# Patient Record
Sex: FEMALE | Race: BLACK OR AFRICAN AMERICAN | NOT HISPANIC OR LATINO | Employment: PART TIME | URBAN - METROPOLITAN AREA
[De-identification: names, ages, dates, MRNs, and addresses within clinical notes are randomized per-mention and may not be internally consistent; named-entity substitution may affect disease eponyms.]

---

## 2019-11-18 ENCOUNTER — OCCUPATIONAL HEALTH (OUTPATIENT)
Dept: URGENT CARE | Facility: CLINIC | Age: 29
End: 2019-11-18

## 2019-11-18 VITALS
DIASTOLIC BLOOD PRESSURE: 76 MMHG | BODY MASS INDEX: 34.16 KG/M2 | OXYGEN SATURATION: 100 % | HEART RATE: 70 BPM | WEIGHT: 205 LBS | SYSTOLIC BLOOD PRESSURE: 112 MMHG | RESPIRATION RATE: 18 BRPM | TEMPERATURE: 99 F | HEIGHT: 65 IN

## 2019-11-18 DIAGNOSIS — Z02.1 PHYSICAL EXAM, PRE-EMPLOYMENT: Primary | ICD-10-CM

## 2019-11-18 PROCEDURE — 99499 PHYSICAL - BASIC COMPLEXITY: ICD-10-PCS | Mod: S$GLB,,, | Performed by: NURSE PRACTITIONER

## 2019-11-18 PROCEDURE — 99499 UNLISTED E&M SERVICE: CPT | Mod: S$GLB,,, | Performed by: NURSE PRACTITIONER

## 2019-11-19 NOTE — PROGRESS NOTES
"Subjective:       Patient ID: Liliana Teague is a 29 y.o. female.    Vitals:  height is 5' 5" (1.651 m) and weight is 93 kg (205 lb). Her temperature is 99 °F (37.2 °C). Her blood pressure is 112/76 and her pulse is 70. Her respiration is 18 and oxygen saturation is 100%.     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  She is not on any medications.  Denies medical history.  Surgical history of breast reduction.    Other   This is a new problem. The current episode started today. Pertinent negatives include no abdominal pain, anorexia, arthralgias, change in bowel habit, chest pain, chills, congestion, coughing, diaphoresis, fatigue, fever, headaches, joint swelling, myalgias, nausea, neck pain, numbness, rash, sore throat, swollen glands, urinary symptoms, vertigo, visual change, vomiting or weakness.       Constitution: Negative for chills, sweating, fatigue and fever.   HENT: Negative for congestion and sore throat.    Neck: Negative for neck pain and painful lymph nodes.   Cardiovascular: Negative for chest pain and leg swelling.   Eyes: Negative for double vision and blurred vision.   Respiratory: Negative for cough and shortness of breath.    Gastrointestinal: Negative for abdominal pain, nausea, vomiting and diarrhea.   Genitourinary: Negative for dysuria, frequency, urgency and history of kidney stones.   Musculoskeletal: Negative for joint pain, joint swelling, muscle cramps and muscle ache.   Skin: Negative for color change, pale, rash and bruising.   Allergic/Immunologic: Negative for seasonal allergies.   Neurological: Negative for dizziness, history of vertigo, light-headedness, passing out, headaches and numbness.   Hematologic/Lymphatic: Negative for swollen lymph nodes.   Psychiatric/Behavioral: Negative for nervous/anxious, sleep " disturbance and depression. The patient is not nervous/anxious.        Objective:      Physical Exam   Constitutional: She is oriented to person, place, and time. She appears well-developed and well-nourished. She is cooperative.  Non-toxic appearance. She does not have a sickly appearance. She does not appear ill. No distress.   HENT:   Head: Normocephalic and atraumatic.   Right Ear: Hearing, tympanic membrane, external ear and ear canal normal.   Left Ear: Hearing, tympanic membrane, external ear and ear canal normal.   Nose: Nose normal. No mucosal edema, rhinorrhea or nasal deformity. No epistaxis. Right sinus exhibits no maxillary sinus tenderness and no frontal sinus tenderness. Left sinus exhibits no maxillary sinus tenderness and no frontal sinus tenderness.   Mouth/Throat: Uvula is midline, oropharynx is clear and moist and mucous membranes are normal. No trismus in the jaw. Normal dentition. No uvula swelling. No posterior oropharyngeal erythema.   Eyes: Pupils are equal, round, and reactive to light. Conjunctivae, EOM and lids are normal. Right eye exhibits no discharge. Left eye exhibits no discharge. No scleral icterus.   Neck: Trachea normal, normal range of motion, full passive range of motion without pain and phonation normal. Neck supple. No spinous process tenderness and no muscular tenderness present. Normal range of motion present.   Cardiovascular: Normal rate, regular rhythm, normal heart sounds, intact distal pulses and normal pulses.   Pulmonary/Chest: Effort normal and breath sounds normal. No respiratory distress. She has no wheezes.   Abdominal: Soft. Normal appearance and bowel sounds are normal. She exhibits no distension, no pulsatile midline mass and no mass. There is no tenderness. There is no rigidity, no rebound, no guarding and no CVA tenderness.   Musculoskeletal: Normal range of motion. She exhibits no edema or deformity.   Neurological: She is alert and oriented to person,  place, and time. She has normal strength and normal reflexes. She displays no atrophy and no tremor. No cranial nerve deficit or sensory deficit. She exhibits normal muscle tone. Coordination and gait normal.   Skin: Skin is warm, dry, intact, not diaphoretic and not pale. Capillary refill takes less than 2 seconds.   Psychiatric: She has a normal mood and affect. Her speech is normal and behavior is normal. Judgment and thought content normal. Cognition and memory are normal.   Nursing note and vitals reviewed.        Assessment:       1. Physical exam, pre-employment        Plan:       Cleared.  See scanned in sheet.  Physical exam, pre-employment

## 2019-11-19 NOTE — PATIENT INSTRUCTIONS
Cleared.    Nonurgent Medical Screening Exam  You have had a medical screening exam. The results show that you dont have a condition that needs to be treated in the emergency department.  You can safely wait until you can see your healthcare provider for evaluation or treatment. It is up to you to make an appointment for follow-up care.  Medical emergencies  If you think you have a medical emergency, please come to the emergency department. Thats what we are here for. A medical emergency might be severe pain. It might be a condition that gets worse. Or it might be problems with a pregnancy.  The emergency department is open to all who need treatment. But if you dont think you have a serious or life-threatening problem, try these other choices.  If you have a primary care doctor:  Call your doctor before coming to the emergency department.  After office hours, someone from your doctors office is on-call by phone. The person on-call may be able to give you advice over the phone on how to take care of the problem  You may be able to get an appointment to see your doctor.  If you dont have a primary care doctor:  Call the referral doctor or clinic shown below during office hours. You should be able to make an appointment to be seen.  If you arent sure whether you are having an emergency, you can always return to the emergency department to be looked at.   Phone advice from the emergency department  We are here 24 hours a day to give emergency care. But this hospital does not give phone advice for medical conditions. If you need advice for a condition that cant wait to be seen by your doctor, you will need to come back to this facility in person.  Date Last Reviewed: 9/1/2016  © 7834-3386 [employer]. [address] [place] [address], [place] [address]. All rights reserved. This information is not intended as a substitute for professional medical care. Always follow your healthcare professional's  instructions.

## 2024-03-12 ENCOUNTER — OFFICE VISIT (OUTPATIENT)
Dept: OBSTETRICS AND GYNECOLOGY | Facility: CLINIC | Age: 34
End: 2024-03-12
Payer: COMMERCIAL

## 2024-03-12 VITALS
SYSTOLIC BLOOD PRESSURE: 126 MMHG | HEART RATE: 83 BPM | HEIGHT: 65 IN | WEIGHT: 208.56 LBS | DIASTOLIC BLOOD PRESSURE: 63 MMHG | BODY MASS INDEX: 34.75 KG/M2

## 2024-03-12 DIAGNOSIS — Z01.419 WELL WOMAN EXAM WITH ROUTINE GYNECOLOGICAL EXAM: Primary | ICD-10-CM

## 2024-03-12 DIAGNOSIS — Z12.4 SCREENING FOR MALIGNANT NEOPLASM OF CERVIX: ICD-10-CM

## 2024-03-12 DIAGNOSIS — Z30.014 ENCOUNTER FOR INITIAL PRESCRIPTION OF INTRAUTERINE CONTRACEPTIVE DEVICE: ICD-10-CM

## 2024-03-12 PROCEDURE — 87624 HPV HI-RISK TYP POOLED RSLT: CPT | Performed by: STUDENT IN AN ORGANIZED HEALTH CARE EDUCATION/TRAINING PROGRAM

## 2024-03-12 PROCEDURE — 1159F MED LIST DOCD IN RCRD: CPT | Mod: CPTII,S$GLB,, | Performed by: STUDENT IN AN ORGANIZED HEALTH CARE EDUCATION/TRAINING PROGRAM

## 2024-03-12 PROCEDURE — 3078F DIAST BP <80 MM HG: CPT | Mod: CPTII,S$GLB,, | Performed by: STUDENT IN AN ORGANIZED HEALTH CARE EDUCATION/TRAINING PROGRAM

## 2024-03-12 PROCEDURE — 3074F SYST BP LT 130 MM HG: CPT | Mod: CPTII,S$GLB,, | Performed by: STUDENT IN AN ORGANIZED HEALTH CARE EDUCATION/TRAINING PROGRAM

## 2024-03-12 PROCEDURE — 99459 PELVIC EXAMINATION: CPT | Mod: S$GLB,,, | Performed by: STUDENT IN AN ORGANIZED HEALTH CARE EDUCATION/TRAINING PROGRAM

## 2024-03-12 PROCEDURE — 3008F BODY MASS INDEX DOCD: CPT | Mod: CPTII,S$GLB,, | Performed by: STUDENT IN AN ORGANIZED HEALTH CARE EDUCATION/TRAINING PROGRAM

## 2024-03-12 PROCEDURE — 88175 CYTOPATH C/V AUTO FLUID REDO: CPT | Performed by: STUDENT IN AN ORGANIZED HEALTH CARE EDUCATION/TRAINING PROGRAM

## 2024-03-12 PROCEDURE — 99999 PR PBB SHADOW E&M-NEW PATIENT-LVL III: CPT | Mod: PBBFAC,,, | Performed by: STUDENT IN AN ORGANIZED HEALTH CARE EDUCATION/TRAINING PROGRAM

## 2024-03-12 PROCEDURE — 1160F RVW MEDS BY RX/DR IN RCRD: CPT | Mod: CPTII,S$GLB,, | Performed by: STUDENT IN AN ORGANIZED HEALTH CARE EDUCATION/TRAINING PROGRAM

## 2024-03-12 PROCEDURE — 99385 PREV VISIT NEW AGE 18-39: CPT | Mod: S$GLB,,, | Performed by: STUDENT IN AN ORGANIZED HEALTH CARE EDUCATION/TRAINING PROGRAM

## 2024-03-12 NOTE — PROGRESS NOTES
"[facility]    Subjective:     Chief Complaint:   Chief Complaint   Patient presents with    Contraception     Discuss IUD replacement       Patient's last menstrual period was Patient's last menstrual period was 2024 (exact date).  Contraception: IUD.      2024    Liliana Montes is a 34 y.o. female  who presents for an annual exam.  She does not want STD screening.  She participates in regular exercise: no.  She does not smoke or vape.  She is taking a multivitamin.     She wants to get another Paragard IUD. She has had her current Paragard since . She has not had an issues with the IUD.     Menstrual cycles occur monthly lasting 6 days with no cramping or heavy flow issues. No issues with her cycles.    Last Pap: 2 years ago. She reports having an abnormal pap smear over 10 years ago and she had "get my cervix scraped".   Gardasil series: she reports getting one dose, unsure if she finished series. She will check her records.     FH:  Breast cancer: maternal cousin (alive).  Colon cancer: none.  Endometrial cancer: none.  Ovarian cancer: none.  Cervical cancer: none.       OB History    Para Term  AB Living   2 2 2     2   SAB IAB Ectopic Multiple Live Births           2      # Outcome Date GA Lbr [place]/2nd Weight Sex Delivery Anes PTL Lv   2 Term 14    F Vag-Spont   JACOB   1 Term 09    M Vag-Spont   JACOB       Past Medical History:   Diagnosis Date    Abnormal Pap smear of cervix      Past Surgical History:   Procedure Laterality Date    TOTAL REDUCTION MAMMOPLASTY  2007    Reduction      Review of patient's allergies indicates:  No Known Allergies    Social History     Tobacco Use    Smoking status: Never    Smokeless tobacco: Never   Substance Use Topics    Alcohol use: Not Currently     Comment: rarely    Drug use: Never       Family History   Problem Relation Age of Onset    No Known Problems Father     No Known Problems Mother     " "Breast cancer Maternal Cousin        Medications  No current outpatient medications on file prior to visit.       Review of Systems   Constitutional: Negative for appetite change, fever and unexpected weight change.   Respiratory: Negative for cough and shortness of breath.    Cardiovascular: Negative for chest pain and palpitations.   Genitourinary: Negative for dyspareunia, dysuria, hematuria and pelvic pain.        GYN ROS per HPI.   Psychiatric/Behavioral: The patient is not nervous/anxious.      Objective:     /63 (BP Location: Left arm, Patient Position: Sitting, BP Method: Medium (Automatic))   Pulse 83   Ht 5' 5" (1.651 m)   Wt 94.6 kg (208 lb 8.9 oz)   LMP 02/26/2024 (Exact Date)   BMI 34.71 kg/m²     Physical Exam  Vitals reviewed. Chaperone present: Female chaperone present.   Constitutional:       General: She is not in acute distress.  HENT:      Head: Normocephalic.   Pulmonary:      Effort: Pulmonary effort is normal. No respiratory distress.   Chest:   Breasts:     Right: No bleeding, mass, nipple discharge, skin change or tenderness.      Left: No bleeding, mass, nipple discharge, skin change or tenderness.      Comments: Breast reduction scars on inferior side of breasts bilaterally.  Well-healed.   Abdominal:      General: Abdomen is flat.      Palpations: Abdomen is soft.   Genitourinary:     Pubic Area: No rash.       Labia:         Right: No tenderness or lesion.         Left: No tenderness or lesion.       Vagina: No tenderness or prolapsed vaginal walls.      Cervix: No cervical motion tenderness.      Uterus: Not enlarged and not tender.       Adnexa:         Right: No mass or tenderness.          Left: No mass or tenderness.        Comments: IUD strings visible in cervical os.    Lymphadenopathy:      Upper Body:      Right upper body: No axillary or pectoral adenopathy.      Left upper body: No axillary or pectoral adenopathy.   Skin:     Findings: No rash.   Neurological:      " General: No focal deficit present.      Mental Status: She is alert.   Psychiatric:         Mood and Affect: Mood normal.         Behavior: Behavior normal.         Assessment:     1. Well woman exam with routine gynecological exam    2. Screening for malignant neoplasm of cervix    3. Encounter for initial prescription of intrauterine contraceptive device      Plan:     1. Well woman exam with routine gynecological exam    2. Screening for malignant neoplasm of cervix  - Liquid-Based Pap Smear, Screening  - HPV High Risk Genotypes, PCR    3. Encounter for initial prescription of intrauterine contraceptive device  - Device Authorization Order      Follow up for IUD placement.       The above was reviewed and discussed with the patient.    Annual exam and screening issues based on the patient's age and family history were discussed.     Breast and pelvic exams were normal.     Order placed for new ParaGard IUD.  Discussed that this is a nonhormonal IUD.  Explained the contraceptive benefits.  Discussed that it does not protect against STDs.  IUD is good for 10 years.    - Pap and HPV testing performed. Further recommendations for future pap smears and HPV testing will be provided once results return on the portal.   - Counseled to get regular exercise at least 3 days a week doing 30 minutes of high intensity exercise or 60 minutes of low-moderate intensity exercise if patient is not currently exercising.   - Counseled to  daily multivitamin for bone integrity.      The patient's questions were answered, and she agrees with the current plan.      The patient was counseled today on the new ACS guidelines for cervical cytology screening as well as the current recommendations for breast cancer screening. She was counseled to follow up with her PCP for other routine health maintenance.      Ifeoma Sterling PA-C  03/12/2024

## 2024-03-19 LAB
HPV HR 12 DNA SPEC QL NAA+PROBE: NEGATIVE
HPV16 AG SPEC QL: NEGATIVE
HPV18 DNA SPEC QL NAA+PROBE: NEGATIVE

## 2024-03-20 LAB
FINAL PATHOLOGIC DIAGNOSIS: NORMAL
Lab: NORMAL

## 2024-03-25 NOTE — PROGRESS NOTES
"[facility]   OFFICE VISIT NOTE    Patient Name: Liliana Montes  YOB: 1990    PRESENTING HISTORY     History of Present Illness:  Ms. Liliana Montes is a 34 y.o. female here for annual  Patient already had well women's annual on 3/12/2024     LMP: 2024     Paraguard IUD 2014 -- would like to continue with the same IUD   Going back next month for IUD insertion     Family history: maternal cousin (alive) -- breast cancer -- believes this was in her 40's     Pap and HPV both normal on 3/12/2024     Surgery: total reduction mammoplasty     Works as a RN, night shifts  Has two kids aged 10 and 14  Sexually active with her       Review of Systems   Constitutional:  Negative for chills, diaphoresis, fatigue and fever.   Respiratory:  Negative for cough, shortness of breath and wheezing.    Cardiovascular:  Negative for chest pain and palpitations.   Gastrointestinal:  Negative for abdominal pain, blood in stool, constipation, diarrhea, nausea and vomiting.   Genitourinary:  Negative for bladder incontinence and hematuria.   Musculoskeletal:         Left shoulder pain   Neurological:  Negative for coordination difficulties.   Psychiatric/Behavioral:  Negative for behavioral problems.           OBJECTIVE:   Vital Signs:  Vitals:    24 0800   BP: 110/70   Pulse: 70   Resp: 18   SpO2: 99%   Weight: 93.6 kg (206 lb 5.6 oz)   Height: 5' 5" (1.651 m)           Physical Exam  Constitutional:       General: She is not in acute distress.     Appearance: She is not ill-appearing or toxic-appearing.   HENT:      Head: Normocephalic and atraumatic.      Mouth/Throat:      Mouth: Mucous membranes are moist.      Pharynx: Uvula midline. No pharyngeal swelling or oropharyngeal exudate.   Eyes:      Extraocular Movements: Extraocular movements intact.      Pupils: Pupils are equal, round, and reactive to light.   Cardiovascular:      Rate and Rhythm: Normal rate and " regular rhythm.   Pulmonary:      Effort: Pulmonary effort is normal. No tachypnea, bradypnea, accessory muscle usage, prolonged expiration or respiratory distress.      Breath sounds: Normal breath sounds. No stridor. No wheezing, rhonchi or rales.   Abdominal:      General: Abdomen is flat. Bowel sounds are normal. There is no distension.      Palpations: Abdomen is soft.      Tenderness: There is no abdominal tenderness. There is no guarding or rebound.   Neurological:      General: No focal deficit present.      Mental Status: She is alert.   Psychiatric:         Mood and Affect: Mood normal.         Behavior: Behavior normal.         ASSESSMENT & PLAN:     Routine general medical examination at a health care facility  -     CBC Without Differential; Future; Expected date: 03/26/2024  -     Comprehensive Metabolic Panel; Future; Expected date: 03/26/2024  -     Lipid Panel; Future; Expected date: 03/26/2024  -     Hemoglobin A1C; Future; Expected date: 03/26/2024  -     TSH; Future; Expected date: 03/26/2024  -     T4, FREE; Future; Expected date: 03/26/2024  -     Hepatitis Panel, Acute; Future; Expected date: 03/26/2024  -     HIV 1/2 Ag/Ab (4th Gen); Future; Expected date: 03/26/2024  -     RPR; Future; Expected date: 03/26/2024  -     C. trachomatis/N. gonorrhoeae by AMP DNA [facility]; Urine; Future; Expected date: 03/26/2024  -     (In Office Administered) Tdap Vaccine  Patient agrees to get STD panel done   Tdap vaccine administered   Will be returning to ob/gyn for another paraguard IUD placement next week       BMI 34.0-34.9,adult  Will check thyroid etiology     Chronic left shoulder pain   For left shoulder pain that is associated with carrying a heavy bag on her left shoulder -- suggested carrying a backpack to distribute the weight more equally between the two shoulders     History of abnormal pap smear  Recent pap smear -- normal for both cytology and HPV    Continue to follow up with ob/gyn     Sun  Maicol Allen MD  Family Medicine  [facility]     This note was created using [employer] voice recognition software that occasionally misinterprets phrases or words

## 2024-03-26 ENCOUNTER — LAB VISIT (OUTPATIENT)
Dept: LAB | Facility: HOSPITAL | Age: 34
End: 2024-03-26
Attending: STUDENT IN AN ORGANIZED HEALTH CARE EDUCATION/TRAINING PROGRAM
Payer: COMMERCIAL

## 2024-03-26 ENCOUNTER — OFFICE VISIT (OUTPATIENT)
Dept: FAMILY MEDICINE | Facility: CLINIC | Age: 34
End: 2024-03-26
Payer: COMMERCIAL

## 2024-03-26 VITALS
WEIGHT: 206.38 LBS | HEIGHT: 65 IN | RESPIRATION RATE: 18 BRPM | HEART RATE: 70 BPM | SYSTOLIC BLOOD PRESSURE: 110 MMHG | DIASTOLIC BLOOD PRESSURE: 70 MMHG | OXYGEN SATURATION: 99 % | BODY MASS INDEX: 34.38 KG/M2

## 2024-03-26 DIAGNOSIS — G89.29 CHRONIC LEFT SHOULDER PAIN: ICD-10-CM

## 2024-03-26 DIAGNOSIS — Z00.00 ROUTINE GENERAL MEDICAL EXAMINATION AT A HEALTH CARE FACILITY: ICD-10-CM

## 2024-03-26 DIAGNOSIS — Z00.00 ROUTINE GENERAL MEDICAL EXAMINATION AT A HEALTH CARE FACILITY: Primary | ICD-10-CM

## 2024-03-26 DIAGNOSIS — Z87.42 HISTORY OF ABNORMAL CERVICAL PAP SMEAR: ICD-10-CM

## 2024-03-26 DIAGNOSIS — M25.512 CHRONIC LEFT SHOULDER PAIN: ICD-10-CM

## 2024-03-26 PROCEDURE — 1160F RVW MEDS BY RX/DR IN RCRD: CPT | Mod: CPTII,S$GLB,, | Performed by: STUDENT IN AN ORGANIZED HEALTH CARE EDUCATION/TRAINING PROGRAM

## 2024-03-26 PROCEDURE — 3074F SYST BP LT 130 MM HG: CPT | Mod: CPTII,S$GLB,, | Performed by: STUDENT IN AN ORGANIZED HEALTH CARE EDUCATION/TRAINING PROGRAM

## 2024-03-26 PROCEDURE — 87491 CHLMYD TRACH DNA AMP PROBE: CPT | Performed by: STUDENT IN AN ORGANIZED HEALTH CARE EDUCATION/TRAINING PROGRAM

## 2024-03-26 PROCEDURE — 90715 TDAP VACCINE 7 YRS/> IM: CPT | Mod: S$GLB,,, | Performed by: STUDENT IN AN ORGANIZED HEALTH CARE EDUCATION/TRAINING PROGRAM

## 2024-03-26 PROCEDURE — 99999 PR PBB SHADOW E&M-EST. PATIENT-LVL III: CPT | Mod: PBBFAC,,, | Performed by: STUDENT IN AN ORGANIZED HEALTH CARE EDUCATION/TRAINING PROGRAM

## 2024-03-26 PROCEDURE — 3008F BODY MASS INDEX DOCD: CPT | Mod: CPTII,S$GLB,, | Performed by: STUDENT IN AN ORGANIZED HEALTH CARE EDUCATION/TRAINING PROGRAM

## 2024-03-26 PROCEDURE — 99214 OFFICE O/P EST MOD 30 MIN: CPT | Mod: 25,S$GLB,, | Performed by: STUDENT IN AN ORGANIZED HEALTH CARE EDUCATION/TRAINING PROGRAM

## 2024-03-26 PROCEDURE — 90471 IMMUNIZATION ADMIN: CPT | Mod: S$GLB,,, | Performed by: STUDENT IN AN ORGANIZED HEALTH CARE EDUCATION/TRAINING PROGRAM

## 2024-03-26 PROCEDURE — 1159F MED LIST DOCD IN RCRD: CPT | Mod: CPTII,S$GLB,, | Performed by: STUDENT IN AN ORGANIZED HEALTH CARE EDUCATION/TRAINING PROGRAM

## 2024-03-26 PROCEDURE — 3078F DIAST BP <80 MM HG: CPT | Mod: CPTII,S$GLB,, | Performed by: STUDENT IN AN ORGANIZED HEALTH CARE EDUCATION/TRAINING PROGRAM

## 2024-03-27 DIAGNOSIS — D64.9 LOW HEMOGLOBIN: Primary | ICD-10-CM

## 2024-03-27 LAB
C TRACH DNA SPEC QL NAA+PROBE: NOT DETECTED
N GONORRHOEA DNA SPEC QL NAA+PROBE: NOT DETECTED

## 2024-04-02 ENCOUNTER — LAB VISIT (OUTPATIENT)
Dept: LAB | Facility: HOSPITAL | Age: 34
End: 2024-04-02
Attending: STUDENT IN AN ORGANIZED HEALTH CARE EDUCATION/TRAINING PROGRAM
Payer: COMMERCIAL

## 2024-04-02 DIAGNOSIS — D64.9 LOW HEMOGLOBIN: ICD-10-CM

## 2024-04-02 LAB
BASOPHILS # BLD AUTO: 0.02 K/UL (ref 0–0.2)
BASOPHILS NFR BLD: 0.3 % (ref 0–1.9)
DIFFERENTIAL METHOD BLD: ABNORMAL
EOSINOPHIL # BLD AUTO: 0.1 K/UL (ref 0–0.5)
EOSINOPHIL NFR BLD: 1.3 % (ref 0–8)
ERYTHROCYTE [DISTWIDTH] IN BLOOD BY AUTOMATED COUNT: 20.9 % (ref 11.5–14.5)
FERRITIN SERPL-MCNC: 3 NG/ML (ref 20–300)
FOLATE SERPL-MCNC: 12.5 NG/ML (ref 4–24)
HCT VFR BLD AUTO: 27.3 % (ref 37–48.5)
HGB BLD-MCNC: 8 G/DL (ref 12–16)
IMM GRANULOCYTES # BLD AUTO: 0.02 K/UL (ref 0–0.04)
IMM GRANULOCYTES NFR BLD AUTO: 0.3 % (ref 0–0.5)
IRON SERPL-MCNC: 13 UG/DL (ref 30–160)
LYMPHOCYTES # BLD AUTO: 2.8 K/UL (ref 1–4.8)
LYMPHOCYTES NFR BLD: 45.1 % (ref 18–48)
MCH RBC QN AUTO: 20 PG (ref 27–31)
MCHC RBC AUTO-ENTMCNC: 29.3 G/DL (ref 32–36)
MCV RBC AUTO: 68 FL (ref 82–98)
MONOCYTES # BLD AUTO: 0.4 K/UL (ref 0.3–1)
MONOCYTES NFR BLD: 5.9 % (ref 4–15)
NEUTROPHILS # BLD AUTO: 2.9 K/UL (ref 1.8–7.7)
NEUTROPHILS NFR BLD: 47.1 % (ref 38–73)
NRBC BLD-RTO: 0 /100 WBC
PLATELET # BLD AUTO: 491 K/UL (ref 150–450)
PMV BLD AUTO: 9.8 FL (ref 9.2–12.9)
RBC # BLD AUTO: 4.01 M/UL (ref 4–5.4)
SATURATED IRON: 3 % (ref 20–50)
TOTAL IRON BINDING CAPACITY: 477 UG/DL (ref 250–450)
TRANSFERRIN SERPL-MCNC: 322 MG/DL (ref 200–375)
VIT B12 SERPL-MCNC: 372 PG/ML (ref 210–950)
WBC # BLD AUTO: 6.12 K/UL (ref 3.9–12.7)

## 2024-04-02 PROCEDURE — 82728 ASSAY OF FERRITIN: CPT | Performed by: STUDENT IN AN ORGANIZED HEALTH CARE EDUCATION/TRAINING PROGRAM

## 2024-04-02 PROCEDURE — 82746 ASSAY OF FOLIC ACID SERUM: CPT | Performed by: STUDENT IN AN ORGANIZED HEALTH CARE EDUCATION/TRAINING PROGRAM

## 2024-04-02 PROCEDURE — 83020 HEMOGLOBIN ELECTROPHORESIS: CPT | Performed by: STUDENT IN AN ORGANIZED HEALTH CARE EDUCATION/TRAINING PROGRAM

## 2024-04-02 PROCEDURE — 36415 COLL VENOUS BLD VENIPUNCTURE: CPT | Mod: PO | Performed by: STUDENT IN AN ORGANIZED HEALTH CARE EDUCATION/TRAINING PROGRAM

## 2024-04-02 PROCEDURE — 82607 VITAMIN B-12: CPT | Performed by: STUDENT IN AN ORGANIZED HEALTH CARE EDUCATION/TRAINING PROGRAM

## 2024-04-02 PROCEDURE — 83540 ASSAY OF IRON: CPT | Performed by: STUDENT IN AN ORGANIZED HEALTH CARE EDUCATION/TRAINING PROGRAM

## 2024-04-02 PROCEDURE — 85025 COMPLETE CBC W/AUTO DIFF WBC: CPT | Performed by: STUDENT IN AN ORGANIZED HEALTH CARE EDUCATION/TRAINING PROGRAM

## 2024-04-03 DIAGNOSIS — D50.8 OTHER IRON DEFICIENCY ANEMIA: Primary | ICD-10-CM

## 2024-04-04 LAB
HB ELECTROPHORESIS INTERP CANCEL: NORMAL
HB ELECTROPHORESIS INTERPRETATION: NORMAL
HGB A MFR BLD ELPH: 97.1 % (ref 95.8–98)
HGB A2 MFR BLD: 2.1 % (ref 2–3.3)
HGB A2+XXX MFR BLD ELPH: NORMAL %
HGB F MFR BLD: 0.8 % (ref 0–0.9)
HGB XXX MFR BLD ELPH: NORMAL %
HPLC HB VARIANT: NORMAL

## 2024-04-09 ENCOUNTER — PATIENT MESSAGE (OUTPATIENT)
Dept: FAMILY MEDICINE | Facility: CLINIC | Age: 34
End: 2024-04-09
Payer: COMMERCIAL

## 2024-04-18 ENCOUNTER — OFFICE VISIT (OUTPATIENT)
Dept: HEMATOLOGY/ONCOLOGY | Facility: CLINIC | Age: 34
End: 2024-04-18
Payer: COMMERCIAL

## 2024-04-18 VITALS
TEMPERATURE: 98 F | OXYGEN SATURATION: 99 % | SYSTOLIC BLOOD PRESSURE: 123 MMHG | HEART RATE: 72 BPM | BODY MASS INDEX: 34.85 KG/M2 | WEIGHT: 209.44 LBS | DIASTOLIC BLOOD PRESSURE: 77 MMHG

## 2024-04-18 DIAGNOSIS — D50.8 OTHER IRON DEFICIENCY ANEMIA: ICD-10-CM

## 2024-04-18 DIAGNOSIS — E66.9 OBESITY (BMI 30.0-34.9): ICD-10-CM

## 2024-04-18 DIAGNOSIS — T83.89XA MENORRHAGIA DUE TO INTRAUTERINE DEVICE (IUD): Primary | ICD-10-CM

## 2024-04-18 DIAGNOSIS — N92.0 MENORRHAGIA DUE TO INTRAUTERINE DEVICE (IUD): Primary | ICD-10-CM

## 2024-04-18 PROBLEM — E66.811 OBESITY (BMI 30.0-34.9): Status: ACTIVE | Noted: 2024-04-18

## 2024-04-18 PROBLEM — D50.9 IRON DEFICIENCY ANEMIA: Status: ACTIVE | Noted: 2024-04-18

## 2024-04-18 PROCEDURE — 3078F DIAST BP <80 MM HG: CPT | Mod: CPTII,S$GLB,, | Performed by: INTERNAL MEDICINE

## 2024-04-18 PROCEDURE — 1160F RVW MEDS BY RX/DR IN RCRD: CPT | Mod: CPTII,S$GLB,, | Performed by: INTERNAL MEDICINE

## 2024-04-18 PROCEDURE — 99999 PR PBB SHADOW E&M-EST. PATIENT-LVL III: CPT | Mod: PBBFAC,,, | Performed by: INTERNAL MEDICINE

## 2024-04-18 PROCEDURE — 3074F SYST BP LT 130 MM HG: CPT | Mod: CPTII,S$GLB,, | Performed by: INTERNAL MEDICINE

## 2024-04-18 PROCEDURE — 1159F MED LIST DOCD IN RCRD: CPT | Mod: CPTII,S$GLB,, | Performed by: INTERNAL MEDICINE

## 2024-04-18 PROCEDURE — 99204 OFFICE O/P NEW MOD 45 MIN: CPT | Mod: S$GLB,,, | Performed by: INTERNAL MEDICINE

## 2024-04-18 PROCEDURE — 3044F HG A1C LEVEL LT 7.0%: CPT | Mod: CPTII,S$GLB,, | Performed by: INTERNAL MEDICINE

## 2024-04-18 PROCEDURE — 3008F BODY MASS INDEX DOCD: CPT | Mod: CPTII,S$GLB,, | Performed by: INTERNAL MEDICINE

## 2024-04-18 NOTE — PROGRESS NOTES
Subjective:       Name: Liliana Montes  : 1990  MRN: [id number]    Chief Complaint   Patient presents with    Anemia     New Patient - Other iron deficiency anemia        Patient is in clinic by herself    HPI: Liliana Montes is a 34 y.o. female presents for evaluation of Anemia (New Patient - Other iron deficiency anemia)    Blood workup done on 2024 showing a hemoglobin of 8 MCV of 68 and platelet count of 491.  Looking at her blood workup going to  the patient has been chronically having a microcytic anemia.  Iron saturation was 3% TIBC was 477 her ferritin was 3.  Vitamin B12 folate were normal.  Hemoglobin electrophoresis was negative.    She had a blood transfusion in  after having her second child. She has an IUD that causes her heavy menstrual bleeding.  The 1st 3 days are the heaviest.  She has been feeling fatigued and crunching on ice.  She denies any chest pain palpitation shortness of breath arthralgia.  Also she has been feeling very sleepy.      The patient is not a vegetarian.  She denies any family history of blood related malignancy or sickle cell trait or disease.  She denies any melena hematochezia hematuria or hematemesis.  Maternal cousin had breast cancer at the age of 41 yo.    Oncology History    No history exists.        Past Medical History:   Diagnosis Date    Abnormal Pap smear of cervix        Past Surgical History:   Procedure Laterality Date    TOTAL REDUCTION MAMMOPLASTY  2007    Reduction        Family History   Problem Relation Name Age of Onset    No Known Problems Mother      Gout Father      Hypertension Paternal Grandmother      Breast cancer Maternal Cousin         Social History     Socioeconomic History    Marital status:    Tobacco Use    Smoking status: Never     Passive exposure: Current    Smokeless tobacco: Never   Substance and Sexual Activity    Alcohol use: Yes     Comment: rarely    Drug use: Never    Sexual activity: Yes      Partners: Male     Birth control/protection: I.U.D.     Social Determinants of Health     Financial Resource Strain: Low Risk  (3/20/2024)    Overall Financial Resource Strain (CARDIA)     Difficulty of Paying Living Expenses: Not hard at all   Food Insecurity: No Food Insecurity (3/20/2024)    Hunger Vital Sign     Worried About Running Out of Food in the Last Year: Never true     Ran Out of Food in the Last Year: Never true   Transportation Needs: No Transportation Needs (3/20/2024)    PRAPARE - Transportation     Lack of Transportation (Medical): No     Lack of Transportation (Non-Medical): No   Physical Activity: Insufficiently Active (3/20/2024)    Exercise Vital Sign     Days of Exercise per Week: 3 days     Minutes of Exercise per Session: 10 min   Stress: No Stress Concern Present (3/20/2024)    [employer] - Occupational Stress Questionnaire     Feeling of Stress : Only a little   Social Connections: Unknown (3/20/2024)    Social Connection and Isolation Panel [NHANES]     Frequency of Communication with Friends and Family: More than three times a week     Frequency of Social Gatherings with Friends and Family: Once a week     Active Member of Clubs or Organizations: No     Attends Club or Organization Meetings: Never     Marital Status:    Housing Stability: Low Risk  (3/20/2024)    Housing Stability Vital Sign     Unable to Pay for Housing in the Last Year: No     Number of Places Lived in the Last Year: 1     Unstable Housing in the Last Year: No       Review of patient's allergies indicates:  No Known Allergies    Review of Systems   Constitutional:  Positive for fatigue. Negative for appetite change and unexpected weight change.   HENT:  Negative for mouth sores.    Eyes:  Negative for visual disturbance.   Respiratory:  Negative for cough and shortness of breath.    Cardiovascular:  Negative for chest pain.   Gastrointestinal:  Negative for abdominal pain and  diarrhea.   Genitourinary:  Negative for frequency.   Musculoskeletal:  Negative for back pain.   Integumentary:  Negative for rash.   Neurological:  Negative for headaches.   Hematological:  Negative for adenopathy.   Psychiatric/Behavioral:  Positive for decreased concentration. The patient is not nervous/anxious.      Answers submitted by the patient for this visit:  Review of Systems Questionnaire (Submitted on 4/15/2024)  appetite change : No  unexpected weight change: No  mouth sores: No  visual disturbance: No  cough: No  shortness of breath: No  chest pain: No  abdominal pain: No  diarrhea: No  frequency: No  back pain: No  rash: No  headaches: No  adenopathy: No  nervous/ anxious: No         Objective:     Vitals:    04/18/24 1125   BP: 123/77   BP Location: Right arm   Patient Position: Sitting   BP Method: Medium (Automatic)   Pulse: 72   Temp: 97.8 °F (36.6 °C)   TempSrc: Temporal   SpO2: 99%   Weight: 95 kg (209 lb 7 oz)        Physical Exam  Vitals reviewed.   Constitutional:       Appearance: Normal appearance.   HENT:      Head: Normocephalic and atraumatic.   Eyes:      General: No scleral icterus.     Pupils: Pupils are equal, round, and reactive to light.   Cardiovascular:      Rate and Rhythm: Normal rate and regular rhythm.      Pulses: Normal pulses.      Heart sounds: Normal heart sounds.   Pulmonary:      Effort: Pulmonary effort is normal.      Breath sounds: Normal breath sounds.   Abdominal:      General: Bowel sounds are normal. There is no distension.   Musculoskeletal:         General: No swelling.   Lymphadenopathy:      Cervical: No cervical adenopathy.   Skin:     General: Skin is warm.      Findings: No rash.   Neurological:      General: No focal deficit present.      Mental Status: She is alert and oriented to person, place, and time.      Cranial Nerves: No cranial nerve deficit.      Motor: No weakness.   Psychiatric:         Mood and Affect: Mood normal.         Behavior:  Behavior normal.                No current outpatient medications on file.     No current facility-administered medications for this visit.       CBC:  Lab Results   Component Value Date    WBC 6.12 04/02/2024    HGB 8.0 (L) 04/02/2024    HCT 27.3 (L) 04/02/2024    MCV 68 (L) 04/02/2024     (H) 04/02/2024         CMP:  Sodium   Date Value Ref Range Status   03/26/2024 140 136 - 145 mmol/L Final     Potassium   Date Value Ref Range Status   03/26/2024 3.7 3.5 - 5.1 mmol/L Final     Chloride   Date Value Ref Range Status   03/26/2024 110 95 - 110 mmol/L Final     CO2   Date Value Ref Range Status   03/26/2024 19 (L) 23 - 29 mmol/L Final     Glucose   Date Value Ref Range Status   03/26/2024 97 70 - 110 mg/dL Final     BUN   Date Value Ref Range Status   03/26/2024 9 6 - 20 mg/dL Final     Creatinine   Date Value Ref Range Status   03/26/2024 0.8 0.5 - 1.4 mg/dL Final     Calcium   Date Value Ref Range Status   03/26/2024 9.4 8.7 - 10.5 mg/dL Final     Total Protein   Date Value Ref Range Status   03/26/2024 8.0 6.0 - 8.4 g/dL Final     Albumin   Date Value Ref Range Status   03/26/2024 3.9 3.5 - 5.2 g/dL Final     Total Bilirubin   Date Value Ref Range Status   03/26/2024 0.7 0.1 - 1.0 mg/dL Final     Comment:     For infants and newborns, interpretation of results should be based  on gestational age, weight and in agreement with clinical  observations.    Premature Infant recommended reference ranges:  Up to 24 hours.............<8.0 mg/dL  Up to 48 hours............<12.0 mg/dL  3-5 days..................<15.0 mg/dL  6-29 days.................<15.0 mg/dL       Alkaline Phosphatase   Date Value Ref Range Status   03/26/2024 46 (L) 55 - 135 U/L Final     AST   Date Value Ref Range Status   03/26/2024 15 10 - 40 U/L Final     ALT   Date Value Ref Range Status   03/26/2024 10 10 - 44 U/L Final     Anion Gap   Date Value Ref Range Status   03/26/2024 11 8 - 16 mmol/L Final       No image results found.       ECOG  SCORE    0 - Fully active-able to carry on all pre-disease performance without restriction              Assessment/Plan:       Iron-deficiency anemia secondary to to chronic blood loss.    I reviewed independently the patient medical record including her laboratory and radiologic findings.  The I discussed the with the patient the results of her blood workup that are confirming the presence of iron-deficiency anemia.  Most likely this is related to her heavy menstrual cycle.  Since she did not try to take oral iron will start with oral iron 1 tablet a day.  The side effects of taking oral iron was discussed at length.  This include but not restricted to nausea gastritis constipation and black stools.  She was advised to take a stool softener to avoid the constipation caused by the oral iron.  In case she is able to tolerated she was advised to increase it to 2 pills a day.  In case she has not able to tolerated she was advised to call me back to schedule her to start IV iron.  She will be seen back again in 6 weeks for a follow-up visit with repeat CBC and ferritin.      Menorrhagia with regular cycles.    Due to her IUD.    The patient is due to have it exchanged this year.      Obesity BMI 34.85 today.    The patient was advised to exercise with a heavy lifestyle and to lose weight               Med Onc Chart Routing      Follow up with physician 6 weeks. virtual visit with repeat CBC and ferritin   Follow up with [facility]    Infusion scheduling note    Injection scheduling note    Labs    Imaging    Pharmacy appointment    Other referrals                   Plan was discussed with the patient at length, and she verbalized understanding. Liliana was given an opportunity to ask questions that were answered to her satisfaction, and she was advised to call in the interval if any problems or questions arise.  Signed:  Mary Montes MD   Hematology and Oncology  [facility] - HEMATOLOGY  ONCOLOGY  [facility]

## 2024-05-30 ENCOUNTER — LAB VISIT (OUTPATIENT)
Dept: LAB | Facility: HOSPITAL | Age: 34
End: 2024-05-30
Attending: INTERNAL MEDICINE
Payer: COMMERCIAL

## 2024-05-30 DIAGNOSIS — D50.8 OTHER IRON DEFICIENCY ANEMIA: ICD-10-CM

## 2024-05-30 LAB
BASOPHILS # BLD AUTO: 0.01 K/UL (ref 0–0.2)
BASOPHILS NFR BLD: 0.2 % (ref 0–1.9)
DIFFERENTIAL METHOD BLD: ABNORMAL
EOSINOPHIL # BLD AUTO: 0 K/UL (ref 0–0.5)
EOSINOPHIL NFR BLD: 0.5 % (ref 0–8)
ERYTHROCYTE [DISTWIDTH] IN BLOOD BY AUTOMATED COUNT: 24.5 % (ref 11.5–14.5)
FERRITIN SERPL-MCNC: 15 NG/ML (ref 20–300)
HCT VFR BLD AUTO: 33.9 % (ref 37–48.5)
HGB BLD-MCNC: 10.5 G/DL (ref 12–16)
IMM GRANULOCYTES # BLD AUTO: 0.01 K/UL (ref 0–0.04)
IMM GRANULOCYTES NFR BLD AUTO: 0.2 % (ref 0–0.5)
LYMPHOCYTES # BLD AUTO: 2.2 K/UL (ref 1–4.8)
LYMPHOCYTES NFR BLD: 39.7 % (ref 18–48)
MCH RBC QN AUTO: 23.2 PG (ref 27–31)
MCHC RBC AUTO-ENTMCNC: 31 G/DL (ref 32–36)
MCV RBC AUTO: 75 FL (ref 82–98)
MONOCYTES # BLD AUTO: 0.3 K/UL (ref 0.3–1)
MONOCYTES NFR BLD: 6.2 % (ref 4–15)
NEUTROPHILS # BLD AUTO: 2.9 K/UL (ref 1.8–7.7)
NEUTROPHILS NFR BLD: 53.2 % (ref 38–73)
NRBC BLD-RTO: 0 /100 WBC
PLATELET # BLD AUTO: 407 K/UL (ref 150–450)
PMV BLD AUTO: 10 FL (ref 9.2–12.9)
RBC # BLD AUTO: 4.52 M/UL (ref 4–5.4)
WBC # BLD AUTO: 5.49 K/UL (ref 3.9–12.7)

## 2024-05-30 PROCEDURE — 85025 COMPLETE CBC W/AUTO DIFF WBC: CPT | Performed by: INTERNAL MEDICINE

## 2024-05-30 PROCEDURE — 36415 COLL VENOUS BLD VENIPUNCTURE: CPT | Mod: PO | Performed by: INTERNAL MEDICINE

## 2024-05-30 PROCEDURE — 82728 ASSAY OF FERRITIN: CPT | Performed by: INTERNAL MEDICINE

## 2024-06-05 ENCOUNTER — OFFICE VISIT (OUTPATIENT)
Dept: HEMATOLOGY/ONCOLOGY | Facility: CLINIC | Age: 34
End: 2024-06-05
Payer: COMMERCIAL

## 2024-06-05 DIAGNOSIS — N92.0 MENORRHAGIA DUE TO INTRAUTERINE DEVICE (IUD): ICD-10-CM

## 2024-06-05 DIAGNOSIS — T83.89XA MENORRHAGIA DUE TO INTRAUTERINE DEVICE (IUD): ICD-10-CM

## 2024-06-05 DIAGNOSIS — E66.9 OBESITY (BMI 30.0-34.9): ICD-10-CM

## 2024-06-05 DIAGNOSIS — D50.0 IRON DEFICIENCY ANEMIA DUE TO CHRONIC BLOOD LOSS: Primary | ICD-10-CM

## 2024-06-05 PROCEDURE — 1160F RVW MEDS BY RX/DR IN RCRD: CPT | Mod: CPTII,95,, | Performed by: INTERNAL MEDICINE

## 2024-06-05 PROCEDURE — 1159F MED LIST DOCD IN RCRD: CPT | Mod: CPTII,95,, | Performed by: INTERNAL MEDICINE

## 2024-06-05 PROCEDURE — 99213 OFFICE O/P EST LOW 20 MIN: CPT | Mod: 95,,, | Performed by: INTERNAL MEDICINE

## 2024-06-05 PROCEDURE — 3044F HG A1C LEVEL LT 7.0%: CPT | Mod: CPTII,95,, | Performed by: INTERNAL MEDICINE

## 2024-06-05 NOTE — PROGRESS NOTES
FOLLOW UP TELEMEDICINE VISIT    Subjective:      Patient ID: Liliana Montes is a 34 y.o. female.  MRN: [id number]  : 1990    An audio and visual care visit was performed with the patient because of the COVID-19 pandemic recommendations for social distancing.    TELEMEDICINE  The patient location is: home  The chief complaint leading to consultation is: Anemia  Visit type: Virtual visit with synchronous audio and video    Total time spent with patient: 5 minutes  20 minutes of total time spent on the encounter, which includes face to face time and non-face to face time preparing to see the patient (eg, review of tests), obtaining and/or reviewing separately obtained history, documenting clinical information in the electronic or other health record, independently interpreting results (not separately reported) and communicating results to the patient/family/caregiver, or care coordination (not separately reported).    Each patient to whom he or she provides medical services by telemedicine is:  (1) informed of the relationship between the physician and patient and the respective role of any other health care provider with respect to management of the patient; and (2) notified that he or she may decline to receive medical services by telemedicine and may withdraw from such care at any time.    History of Present Illness:   HPI Liliana Montes is a 34 y.o. female presents for evaluation done to of  her iron deficiency anemia.  Her sleepiness is better.    She denies any chest pain palpitation shortness of breath arthralgia.  She denies any melena hematochezia hematuria or hematemesis.   Oncology History:  Oncology History    No history exists.      Past medical, surgical, family, and social history were reviewed today and there are no changes of note unless mentioned in HPI.   MEDS and ALLERGIES were reviewed with patient and meds reconciled.     History:  Past Medical History:   Diagnosis Date    Abnormal Pap  smear of cervix       Past Surgical History:   Procedure Laterality Date    TOTAL REDUCTION MAMMOPLASTY  2007    Reduction      Family History   Problem Relation Name Age of Onset    No Known Problems Mother      Gout Father      Hypertension Paternal Grandmother      Breast cancer Maternal Cousin        Social History     Tobacco Use    Smoking status: Never     Passive exposure: Current    Smokeless tobacco: Never   Substance and Sexual Activity    Alcohol use: Yes     Comment: rarely    Drug use: Never    Sexual activity: Yes     Partners: Male     Birth control/protection: I.U.D.        ROS:  Review of Systems   Respiratory:  Negative for cough and shortness of breath.    Cardiovascular:  Negative for chest pain.   Gastrointestinal:  Negative for abdominal pain and diarrhea.   Genitourinary:  Negative for frequency.   Musculoskeletal:  Negative for back pain.   Skin:  Negative for rash.   Neurological:  Negative for headaches.   Psychiatric/Behavioral:  The patient is not nervous/anxious.      Answers submitted by the patient for this visit:  Review of Systems Questionnaire (Submitted on 6/3/2024)  appetite change : No  unexpected weight change: No  mouth sores: No  visual disturbance: No  cough: No  shortness of breath: No  chest pain: No  abdominal pain: No  diarrhea: No  frequency: No  back pain: No  rash: No  headaches: No  adenopathy: No  nervous/ anxious: No  Objective:   There were no vitals filed for this visit.  Wt Readings from Last 10 Encounters:   04/18/24 95 kg (209 lb 7 oz)   03/26/24 93.6 kg (206 lb 5.6 oz)   03/12/24 94.6 kg (208 lb 8.9 oz)   11/18/19 93 kg (205 lb)       Physical Examination:   Constitutional: she is alert, pleasant, and does not appear to be in any physical distress   HENT: Mouth/Throat:  Tongue is midline without evidence of glossitis  Eyes: No obvious jaundice or conjunctivitis.  EOM are normal.   Pulmonary/Chest: No stridor noted. No excess chest muscle  movement.  Neurological: she is alert and oriented to person, place, and time. No cranial nerve deficit.  Skin:  No rash noted. No erythema.   Psychiatric: she has a normal mood and affect. Speech and memory normal.     Diagnostic Tests:  Significant Imaging:  I have reviewed and interpreted all pertinent imaging results/findings.    Laboratory Data:  Lab Results   Component Value Date    WBC 5.49 05/30/2024    HGB 10.5 (L) 05/30/2024    HCT 33.9 (L) 05/30/2024     05/30/2024    CHOL 131 03/26/2024    TRIG 28 (L) 03/26/2024    HDL 64 03/26/2024    ALT 10 03/26/2024    AST 15 03/26/2024     03/26/2024    K 3.7 03/26/2024     03/26/2024    CREATININE 0.8 03/26/2024    BUN 9 03/26/2024    CO2 19 (L) 03/26/2024    TSH 1.963 03/26/2024    HGBA1C 5.3 03/26/2024        Labs:   Lab Results   Component Value Date    WBC 5.49 05/30/2024    RBC 4.52 05/30/2024    HGB 10.5 (L) 05/30/2024    HCT 33.9 (L) 05/30/2024    MCV 75 (L) 05/30/2024     05/30/2024    GLU 97 03/26/2024     03/26/2024    K 3.7 03/26/2024    BUN 9 03/26/2024    CREATININE 0.8 03/26/2024    AST 15 03/26/2024    ALT 10 03/26/2024    BILITOT 0.7 03/26/2024         Assessment/Plan:     ECOG SCORE    1 - Restricted in strenuous activity-ambulatory and able to carry out work of a light nature       Iron-deficiency anemia secondary to to chronic blood loss.    I reviewed independently the patient medical record including her laboratory and radiologic findings.  The I discussed the with the patient the results of her blood workup that are confirming the presence of iron-deficiency anemia.  Most likely this is related to her heavy menstrual cycle.  She will continue taking 2 pills per day with orange juice. The side effects of taking oral iron was discussed at length.  This include but not restricted to nausea gastritis constipation and black stools.  She was advised to take a stool softener to avoid the constipation caused by the oral  iron.  In case she has not able to tolerated she was advised to call me back to schedule her to start IV iron.  Labs on May 30, 2024 showed a Hgb 10.5 g/dl improved from 8 g/dl on April 2, 2024. Ferritin is 15 improved from 3.  She will be seen back again in 2 months for a follow-up visit with repeat CBC and ferritin.        Menorrhagia with regular cycles.    Due to her IUD.    The patient is due to have it exchanged this year.       Obesity BMI 34.85   The patient was advised to exercise with a heavy lifestyle and to lose weight                Med Onc Chart Routing      Follow up with physician 2 months. virtual with repeat CBC and ferritin   Follow up with [facility]    Infusion scheduling note    Injection scheduling note    Labs    Imaging    Pharmacy appointment    Other referrals                   Plan was discussed with the patient at length, and she verbalized understanding. Liliana was given an opportunity to ask questions that were answered to her satisfaction, and she was advised to call in the interval if any problems or questions arise.  Discussed COVID-19 and social distancing in great detail, avoid all non-essential visits out of the home if possible and avoid sick contacts.     Electronically signed by Mary Montes MD

## 2024-06-24 ENCOUNTER — TELEPHONE (OUTPATIENT)
Dept: OBSTETRICS AND GYNECOLOGY | Facility: CLINIC | Age: 34
End: 2024-06-24
Payer: COMMERCIAL

## 2024-06-24 NOTE — TELEPHONE ENCOUNTER
Spoke with pt to reschedule her appt today due to her not starting her cycle yet. Pt was rescheduled with Dr. Blunt for 7/1/24 @ 1:00 pm. Pt verbalized understanding.

## 2024-06-24 NOTE — TELEPHONE ENCOUNTER
----- Message from Krupa Mary sent at 6/24/2024  8:22 AM CDT -----  Regarding: Call back  Type:  Needs Medical Advice    Who Called: Pt    Would the patient rather a call back or a response via [facility]? Call back    Best Call Back Number: [phone]    Additional Information: Pt is requesting a call back about some issues she is having. Thank you

## 2024-07-01 ENCOUNTER — OFFICE VISIT (OUTPATIENT)
Dept: OBSTETRICS AND GYNECOLOGY | Facility: CLINIC | Age: 34
End: 2024-07-01
Payer: COMMERCIAL

## 2024-07-01 VITALS
WEIGHT: 213.88 LBS | BODY MASS INDEX: 35.63 KG/M2 | DIASTOLIC BLOOD PRESSURE: 62 MMHG | HEIGHT: 65 IN | SYSTOLIC BLOOD PRESSURE: 110 MMHG

## 2024-07-01 DIAGNOSIS — Z97.5 ATTEMPTED IUD REMOVAL, UNSUCCESSFUL: Primary | ICD-10-CM

## 2024-07-01 DIAGNOSIS — Z53.8 ATTEMPTED IUD REMOVAL, UNSUCCESSFUL: Primary | ICD-10-CM

## 2024-07-01 PROCEDURE — 1159F MED LIST DOCD IN RCRD: CPT | Mod: CPTII,S$GLB,, | Performed by: OBSTETRICS & GYNECOLOGY

## 2024-07-01 PROCEDURE — 3074F SYST BP LT 130 MM HG: CPT | Mod: CPTII,S$GLB,, | Performed by: OBSTETRICS & GYNECOLOGY

## 2024-07-01 PROCEDURE — 3044F HG A1C LEVEL LT 7.0%: CPT | Mod: CPTII,S$GLB,, | Performed by: OBSTETRICS & GYNECOLOGY

## 2024-07-01 PROCEDURE — 58301 REMOVE INTRAUTERINE DEVICE: CPT | Mod: S$GLB,,, | Performed by: OBSTETRICS & GYNECOLOGY

## 2024-07-01 PROCEDURE — 99999 PR PBB SHADOW E&M-EST. PATIENT-LVL III: CPT | Mod: PBBFAC,,, | Performed by: OBSTETRICS & GYNECOLOGY

## 2024-07-01 PROCEDURE — 3078F DIAST BP <80 MM HG: CPT | Mod: CPTII,S$GLB,, | Performed by: OBSTETRICS & GYNECOLOGY

## 2024-07-01 PROCEDURE — 99214 OFFICE O/P EST MOD 30 MIN: CPT | Mod: 25,S$GLB,, | Performed by: OBSTETRICS & GYNECOLOGY

## 2024-07-01 PROCEDURE — 3008F BODY MASS INDEX DOCD: CPT | Mod: CPTII,S$GLB,, | Performed by: OBSTETRICS & GYNECOLOGY

## 2024-07-01 NOTE — PROGRESS NOTES
Subjective:   Chief Complaint:  Contraception (Paragard removal and insertion)      Date: 2024     Patient ID: Liliana Montes is a  34 y.o. female.    Contraception:  ParaGard IUD    Patient's last menstrual period was 2024 (exact date).    She was previously seen by AMERICA Mijares [as the patient is new to me her previous office note and chart were reviewed.]    The patient presents today due to the following:  She has had a ParaGard IUD in place for approximately 10 years and desires removal and replacement with a new ParaGard IUD.    From a gynecologic standpoint she is currently doing well without complaints.    GYN & OB History  LMP: Patient's last menstrual period was 2024 (exact date).     Age of Menarche:11  Age at first pregnancy:18   Age at first live birth:19  Number of months breastfeeding:    Age at Menopause:    Comments:     OB History          2    Para   2    Term   2            AB        Living   2         SAB        IAB        Ectopic        Multiple        Live Births   2                 Allergies: Review of patient's allergies indicates:  No Known Allergies    Past Medical History:   Diagnosis Date    Abnormal Pap smear of cervix        Past Surgical History:   Procedure Laterality Date    TOTAL REDUCTION MAMMOPLASTY  2007    Reduction        Medications  No current outpatient medications on file.     Social History     Tobacco Use    Smoking status: Never     Passive exposure: Current    Smokeless tobacco: Never   Substance Use Topics    Alcohol use: Yes     Comment: rarely    Drug use: Never       Family History   Problem Relation Name Age of Onset    No Known Problems Mother      Gout Father      Hypertension Paternal Grandmother      Breast cancer Maternal Cousin         Review of Systems (at today's evaluation)  Review of Systems   Constitutional:  Negative for fever and unexpected weight change.   Respiratory: Negative.     Cardiovascular:   Negative for chest pain and palpitations.   Gastrointestinal:  Negative for nausea and vomiting.   Genitourinary:  Negative for dysuria, hematuria and urgency.        Gyn as per HPI   Neurological:  Negative for headaches.        Objective:      Vitals:    07/01/24 1308   BP: 110/62     Physical Exam:   Constitutional: She appears well-developed and well-nourished.    HENT:   Head: Normocephalic.     Neck: No thyroid mass present.    Cardiovascular:  Normal rate.             Pulmonary/Chest: Effort normal. No respiratory distress.        Abdominal: Soft. There is no abdominal tenderness.     Genitourinary:    Inguinal canal, vagina, uterus, right adnexa and left adnexa normal.      Pelvic exam was performed with patient supine.   The external female genitalia was normal.   No external genitalia lesions identified,Cervix is normal. Right adnexum displays no mass and no tenderness. Left adnexum displays no mass and no tenderness. No tenderness or bleeding in the vagina. Uterus is not tender. Normal urethral meatus.Urethra findings: no tendernessBladder findings: no bladder tenderness   Genitourinary Comments: A chaperone (female medical assistant) was present throughout the pelvic exam.             Musculoskeletal: Normal range of motion.      Right lower leg: No edema.      Left lower leg: No edema.      Lymphadenopathy:     She has no cervical adenopathy. No inguinal adenopathy noted on the right or left side.    Neurological: She is alert.    Skin: Skin is warm and dry.    Psychiatric: Mood normal.       Pre-Procedure Time Out  The patient's identification was confirmed.  The planned procedure and procedure site were discussed.  The pros, cons, risks, benefits, alternatives, and indications of the office procedure were discussed in detail with the patient.    We discussed the possibility of allergic reactions, bleeding, infection, and other issues unique to this procedure were discussed.    All participating  parties are in agreed with the current procedure plan.  Verbal consent from the patient was obtained.     Intrauterine Device Removal & Replacement:  7/1/2024    Prior to the procedure the pros cons risks benefits alternatives and indications of removal and subsequent replacement the patient's IUD were discussed.  The patient's questions were answered.    A speculum was placed into the vaginal vault.  The cervix was visualized with no discharge, lesions or abnormalities noted.      The intrauterine device string was easily visualized.  The IUD string was grasped and the IUD was removed with ring forceps without difficulty.  As per photograph below it appears that one of the arms within the IUD remained within the uterine cavity.  Inspection of the cervical os and vaginal vault noted no portion of the IUD.    The patient tolerated the IUD removal without difficulty.          Assessment:        1. Attempted IUD removal, unsuccessful        Plan:      Attempted IUD removal, unsuccessful       Follow up for As needed or 2 weeks following surgery.     The above was reviewed discussed with the patient.  She tolerated removal of the IUD, but we discussed the fact that a portion of the IUD appears to be within the endometrial cavity.    Options reviewed and at this time the patient will be scheduled for hysteroscopy with evaluation of the endometrial cavity, removal of the residual IUD portion and replacement with a new ParaGard IUD.      The pros, cons, risks, benefits, alternatives, and indications of the surgical procedure were discussed in detail with the patient.  We discussed the possibility of allergic reactions, bleeding, infection damage to surrounding structures (cervix, uterus, bladder, ureters, bowel) and other abdominal pelvic organs.  Issues unique to this procedure were discussed.    The patient's questions regarding above were answered and she agrees with this plan.  The patient was provided with the  informed consent form and given times reviewed the form.  Questions were answered and consent was obtained    The patient's questions were answered, and she is in agreement with the current plan.     Rajesh Blunt MD  Department OBGYN [facility]

## 2024-07-11 ENCOUNTER — PATIENT MESSAGE (OUTPATIENT)
Dept: OBSTETRICS AND GYNECOLOGY | Facility: CLINIC | Age: 34
End: 2024-07-11
Payer: COMMERCIAL

## 2024-07-11 NOTE — TELEPHONE ENCOUNTER
Please advise. Pt was seen 7/1 to get Paragard removed and a new one inserted, but half of the device came out. Pt was supposed to be scheduled to get put under anesthesia to remove the other piece and get a new device inserted. Did you want pt to come in for evaluation?

## 2024-07-12 ENCOUNTER — TELEPHONE (OUTPATIENT)
Dept: GYNECOLOGY | Facility: HOSPITAL | Age: 34
End: 2024-07-12

## 2024-07-12 ENCOUNTER — TELEPHONE (OUTPATIENT)
Dept: OBSTETRICS AND GYNECOLOGY | Facility: CLINIC | Age: 34
End: 2024-07-12
Payer: COMMERCIAL

## 2024-07-12 ENCOUNTER — PATIENT MESSAGE (OUTPATIENT)
Dept: OBSTETRICS AND GYNECOLOGY | Facility: CLINIC | Age: 34
End: 2024-07-12
Payer: COMMERCIAL

## 2024-07-12 DIAGNOSIS — Z30.09 ENCOUNTER FOR COUNSELING REGARDING CONTRACEPTION: Primary | ICD-10-CM

## 2024-07-12 NOTE — TELEPHONE ENCOUNTER
----- Message from Rajesh Blunt MD sent at 7/12/2024 10:54 AM CDT -----  Please contact this patient and let her know that based on the picture she sent it appears the remainder of the IUD has expelled.  I believe she would like a new ParaGard.  I have placed an order in her chart for the ParaGard.  Set her up for appointment once the ParaGard has been approved.  Let me know if she has any questions or concerns.   Maynor Mahoney(Resident)

## 2024-08-01 ENCOUNTER — LAB VISIT (OUTPATIENT)
Dept: LAB | Facility: HOSPITAL | Age: 34
End: 2024-08-01
Attending: INTERNAL MEDICINE
Payer: COMMERCIAL

## 2024-08-01 DIAGNOSIS — D50.0 IRON DEFICIENCY ANEMIA DUE TO CHRONIC BLOOD LOSS: ICD-10-CM

## 2024-08-01 DIAGNOSIS — N92.0 MENORRHAGIA DUE TO INTRAUTERINE DEVICE (IUD): ICD-10-CM

## 2024-08-01 DIAGNOSIS — T83.89XA MENORRHAGIA DUE TO INTRAUTERINE DEVICE (IUD): ICD-10-CM

## 2024-08-01 LAB
BASOPHILS # BLD AUTO: 0.01 K/UL (ref 0–0.2)
BASOPHILS NFR BLD: 0.2 % (ref 0–1.9)
DIFFERENTIAL METHOD BLD: ABNORMAL
EOSINOPHIL # BLD AUTO: 0.1 K/UL (ref 0–0.5)
EOSINOPHIL NFR BLD: 1.7 % (ref 0–8)
ERYTHROCYTE [DISTWIDTH] IN BLOOD BY AUTOMATED COUNT: 17.1 % (ref 11.5–14.5)
FERRITIN SERPL-MCNC: 54 NG/ML (ref 20–300)
HCT VFR BLD AUTO: 39.3 % (ref 37–48.5)
HGB BLD-MCNC: 12.3 G/DL (ref 12–16)
IMM GRANULOCYTES # BLD AUTO: 0.01 K/UL (ref 0–0.04)
IMM GRANULOCYTES NFR BLD AUTO: 0.2 % (ref 0–0.5)
LYMPHOCYTES # BLD AUTO: 1.9 K/UL (ref 1–4.8)
LYMPHOCYTES NFR BLD: 33.4 % (ref 18–48)
MCH RBC QN AUTO: 24.3 PG (ref 27–31)
MCHC RBC AUTO-ENTMCNC: 31.3 G/DL (ref 32–36)
MCV RBC AUTO: 78 FL (ref 82–98)
MONOCYTES # BLD AUTO: 0.5 K/UL (ref 0.3–1)
MONOCYTES NFR BLD: 8.3 % (ref 4–15)
NEUTROPHILS # BLD AUTO: 3.3 K/UL (ref 1.8–7.7)
NEUTROPHILS NFR BLD: 56.2 % (ref 38–73)
NRBC BLD-RTO: 0 /100 WBC
PLATELET # BLD AUTO: 399 K/UL (ref 150–450)
PMV BLD AUTO: 9.7 FL (ref 9.2–12.9)
RBC # BLD AUTO: 5.07 M/UL (ref 4–5.4)
WBC # BLD AUTO: 5.81 K/UL (ref 3.9–12.7)

## 2024-08-01 PROCEDURE — 36415 COLL VENOUS BLD VENIPUNCTURE: CPT | Mod: PO | Performed by: INTERNAL MEDICINE

## 2024-08-01 PROCEDURE — 85025 COMPLETE CBC W/AUTO DIFF WBC: CPT | Performed by: INTERNAL MEDICINE

## 2024-08-01 PROCEDURE — 82728 ASSAY OF FERRITIN: CPT | Performed by: INTERNAL MEDICINE

## 2024-08-05 ENCOUNTER — OFFICE VISIT (OUTPATIENT)
Dept: HEMATOLOGY/ONCOLOGY | Facility: CLINIC | Age: 34
End: 2024-08-05
Payer: COMMERCIAL

## 2024-08-05 DIAGNOSIS — D50.0 IRON DEFICIENCY ANEMIA DUE TO CHRONIC BLOOD LOSS: Primary | ICD-10-CM

## 2024-08-05 DIAGNOSIS — N92.0 MENORRHAGIA DUE TO INTRAUTERINE DEVICE (IUD): ICD-10-CM

## 2024-08-05 DIAGNOSIS — T83.89XA MENORRHAGIA DUE TO INTRAUTERINE DEVICE (IUD): ICD-10-CM

## 2024-08-05 DIAGNOSIS — E66.9 OBESITY (BMI 30.0-34.9): ICD-10-CM

## 2024-08-05 PROCEDURE — G2211 COMPLEX E/M VISIT ADD ON: HCPCS | Mod: 95,,, | Performed by: INTERNAL MEDICINE

## 2024-08-05 PROCEDURE — 99213 OFFICE O/P EST LOW 20 MIN: CPT | Mod: 95,,, | Performed by: INTERNAL MEDICINE

## 2024-08-05 PROCEDURE — 3044F HG A1C LEVEL LT 7.0%: CPT | Mod: CPTII,95,, | Performed by: INTERNAL MEDICINE

## 2024-08-06 ENCOUNTER — OFFICE VISIT (OUTPATIENT)
Dept: OBSTETRICS AND GYNECOLOGY | Facility: CLINIC | Age: 34
End: 2024-08-06
Payer: COMMERCIAL

## 2024-08-06 VITALS
SYSTOLIC BLOOD PRESSURE: 110 MMHG | DIASTOLIC BLOOD PRESSURE: 72 MMHG | WEIGHT: 217.38 LBS | BODY MASS INDEX: 36.22 KG/M2 | HEIGHT: 65 IN

## 2024-08-06 DIAGNOSIS — Z30.430 ENCOUNTER FOR INSERTION OF COPPER IUD: Primary | ICD-10-CM

## 2024-08-06 PROCEDURE — 99499 UNLISTED E&M SERVICE: CPT | Mod: S$GLB,,, | Performed by: OBSTETRICS & GYNECOLOGY

## 2024-08-06 PROCEDURE — 58300 INSERT INTRAUTERINE DEVICE: CPT | Mod: S$GLB,,, | Performed by: OBSTETRICS & GYNECOLOGY

## 2024-08-06 PROCEDURE — 99999 PR PBB SHADOW E&M-EST. PATIENT-LVL III: CPT | Mod: PBBFAC,,, | Performed by: OBSTETRICS & GYNECOLOGY

## 2024-10-08 ENCOUNTER — OFFICE VISIT (OUTPATIENT)
Dept: OBSTETRICS AND GYNECOLOGY | Facility: CLINIC | Age: 34
End: 2024-10-08
Payer: COMMERCIAL

## 2024-10-08 VITALS
BODY MASS INDEX: 36.22 KG/M2 | DIASTOLIC BLOOD PRESSURE: 62 MMHG | WEIGHT: 217.38 LBS | HEIGHT: 65 IN | SYSTOLIC BLOOD PRESSURE: 104 MMHG

## 2024-10-08 DIAGNOSIS — Z97.5 IUD (INTRAUTERINE DEVICE) IN PLACE: Primary | ICD-10-CM

## 2024-10-08 PROCEDURE — 3008F BODY MASS INDEX DOCD: CPT | Mod: CPTII,S$GLB,, | Performed by: OBSTETRICS & GYNECOLOGY

## 2024-10-08 PROCEDURE — 3074F SYST BP LT 130 MM HG: CPT | Mod: CPTII,S$GLB,, | Performed by: OBSTETRICS & GYNECOLOGY

## 2024-10-08 PROCEDURE — 3044F HG A1C LEVEL LT 7.0%: CPT | Mod: CPTII,S$GLB,, | Performed by: OBSTETRICS & GYNECOLOGY

## 2024-10-08 PROCEDURE — 1159F MED LIST DOCD IN RCRD: CPT | Mod: CPTII,S$GLB,, | Performed by: OBSTETRICS & GYNECOLOGY

## 2024-10-08 PROCEDURE — 99214 OFFICE O/P EST MOD 30 MIN: CPT | Mod: S$GLB,,, | Performed by: OBSTETRICS & GYNECOLOGY

## 2024-10-08 PROCEDURE — 3078F DIAST BP <80 MM HG: CPT | Mod: CPTII,S$GLB,, | Performed by: OBSTETRICS & GYNECOLOGY

## 2024-10-08 PROCEDURE — 99999 PR PBB SHADOW E&M-EST. PATIENT-LVL III: CPT | Mod: PBBFAC,,, | Performed by: OBSTETRICS & GYNECOLOGY

## 2024-10-08 RX ORDER — FERROUS SULFATE 325(65) MG
TABLET, DELAYED RELEASE (ENTERIC COATED) ORAL
COMMUNITY
Start: 2024-04-06

## 2024-10-08 NOTE — PROGRESS NOTES
Subjective:   Chief Complaint:  Contraception (IUD check)    Date: 10/8/2024     Patient ID: Liliana Montes is a  34 y.o. female.    Contraception:  ParaGard IUD (2024)    Patient's last menstrual period was 09/15/2024 (exact date).    On 2024 the patient had a ParaGard IUD placed.  She presents today for follow-up evaluation.    She reports no issues with the IUD since placement and is doing well from a gynecologic standpoint.    GYN & OB History  LMP: Patient's last menstrual period was 09/15/2024 (exact date).     Age of Menarche:11  Age at first pregnancy:18   Age at first live birth:19  Number of months breastfeeding:    Age at Menopause:    Comments:     OB History          2    Para   2    Term   2            AB        Living   2         SAB        IAB        Ectopic        Multiple        Live Births   2                 Allergies: Review of patient's allergies indicates:  No Known Allergies    Past Medical History:   Diagnosis Date    Abnormal Pap smear of cervix        Past Surgical History:   Procedure Laterality Date    TOTAL REDUCTION MAMMOPLASTY      Reduction        Medications    Current Outpatient Medications:     ferrous sulfate 325 (65 FE) MG EC tablet, , Disp: , Rfl:      Social History     Tobacco Use    Smoking status: Never     Passive exposure: Current    Smokeless tobacco: Never   Substance Use Topics    Alcohol use: Yes     Comment: rarely    Drug use: Never       Family History   Problem Relation Name Age of Onset    No Known Problems Mother      Gout Father      Hypertension Paternal Grandmother      Breast cancer Maternal Cousin         Review of Systems (at today's evaluation)  Review of Systems   Constitutional:  Negative for fever and unexpected weight change.   Respiratory:  Negative for cough and shortness of breath.    Cardiovascular:  Negative for chest pain and palpitations.   Gastrointestinal:  Negative for constipation, diarrhea, nausea  and vomiting.   Genitourinary:  Negative for dysuria, frequency, hematuria and urgency.        Gyn as per HPI   Neurological:  Negative for headaches.        Objective:      Vitals:    10/08/24 0935   BP: 104/62     Physical Exam:   Constitutional: She appears well-developed and well-nourished. No distress.    HENT:   Head: Normocephalic.     Neck: No thyroid mass present.    Cardiovascular:  Normal rate.             Pulmonary/Chest: Effort normal. No respiratory distress.        Abdominal: Soft. There is no abdominal tenderness.     Genitourinary:    Inguinal canal, vagina, uterus, right adnexa and left adnexa normal.      Pelvic exam was performed with patient supine.   The external female genitalia was normal.   No external genitalia lesions identified,Cervix is normal. Right adnexum displays no mass and no tenderness. Left adnexum displays no mass and no tenderness. No tenderness or bleeding in the vagina. Uterus is not tender. Normal urethral meatus.Urethra findings: no tendernessBladder findings: no bladder tenderness   Genitourinary Comments: A chaperone (female medical assistant) was present throughout the pelvic exam.   IUD strings visualized.          Musculoskeletal: Normal range of motion.      Right lower leg: No edema.      Left lower leg: No edema.      Lymphadenopathy: No inguinal adenopathy noted on the right or left side.    Neurological: She is alert.    Skin: Skin is warm and dry.    Psychiatric: She has a normal mood and affect. Mood normal.         Assessment:        1. IUD (intrauterine device) in place      Plan:      IUD (intrauterine device) in place      Follow up for as needed / for any GYN related issues.     The above was reviewed discussed with the patient.  On visualization and bimanual exam the IUD appears to be appropriately placed.    The patient is currently doing well with the ParaGard IUD.    Short-term and long-term issues associated with the IUD were discussed.  We will base  further evaluation treatment of the patient's status over time.    The patient's questions were answered, and she is in agreement with the current plan.     Rajesh Blunt MD  Department OBN  [facility]

## 2024-10-19 PROBLEM — N92.0 MENORRHAGIA DUE TO INTRAUTERINE DEVICE (IUD): Status: RESOLVED | Noted: 2024-04-18 | Resolved: 2024-10-19

## 2024-10-19 PROBLEM — T83.89XA MENORRHAGIA DUE TO INTRAUTERINE DEVICE (IUD): Status: RESOLVED | Noted: 2024-04-18 | Resolved: 2024-10-19

## 2024-11-04 ENCOUNTER — LAB VISIT (OUTPATIENT)
Dept: LAB | Facility: HOSPITAL | Age: 34
End: 2024-11-04
Attending: INTERNAL MEDICINE
Payer: COMMERCIAL

## 2024-11-04 DIAGNOSIS — N92.0 MENORRHAGIA DUE TO INTRAUTERINE DEVICE (IUD): ICD-10-CM

## 2024-11-04 DIAGNOSIS — T83.89XA MENORRHAGIA DUE TO INTRAUTERINE DEVICE (IUD): ICD-10-CM

## 2024-11-04 DIAGNOSIS — D50.0 IRON DEFICIENCY ANEMIA DUE TO CHRONIC BLOOD LOSS: ICD-10-CM

## 2024-11-04 DIAGNOSIS — E66.811 OBESITY (BMI 30.0-34.9): ICD-10-CM

## 2024-11-04 LAB
BASOPHILS # BLD AUTO: 0.02 K/UL (ref 0–0.2)
BASOPHILS NFR BLD: 0.3 % (ref 0–1.9)
DIFFERENTIAL METHOD BLD: ABNORMAL
EOSINOPHIL # BLD AUTO: 0 K/UL (ref 0–0.5)
EOSINOPHIL NFR BLD: 0.6 % (ref 0–8)
ERYTHROCYTE [DISTWIDTH] IN BLOOD BY AUTOMATED COUNT: 14.8 % (ref 11.5–14.5)
FERRITIN SERPL-MCNC: 23 NG/ML (ref 20–300)
HCT VFR BLD AUTO: 36.3 % (ref 37–48.5)
HGB BLD-MCNC: 11.4 G/DL (ref 12–16)
IMM GRANULOCYTES # BLD AUTO: 0.02 K/UL (ref 0–0.04)
IMM GRANULOCYTES NFR BLD AUTO: 0.3 % (ref 0–0.5)
LYMPHOCYTES # BLD AUTO: 2.9 K/UL (ref 1–4.8)
LYMPHOCYTES NFR BLD: 39.8 % (ref 18–48)
MCH RBC QN AUTO: 24.7 PG (ref 27–31)
MCHC RBC AUTO-ENTMCNC: 31.4 G/DL (ref 32–36)
MCV RBC AUTO: 79 FL (ref 82–98)
MONOCYTES # BLD AUTO: 0.5 K/UL (ref 0.3–1)
MONOCYTES NFR BLD: 6.5 % (ref 4–15)
NEUTROPHILS # BLD AUTO: 3.8 K/UL (ref 1.8–7.7)
NEUTROPHILS NFR BLD: 52.5 % (ref 38–73)
NRBC BLD-RTO: 0 /100 WBC
PLATELET # BLD AUTO: 364 K/UL (ref 150–450)
PMV BLD AUTO: 9.9 FL (ref 9.2–12.9)
RBC # BLD AUTO: 4.61 M/UL (ref 4–5.4)
WBC # BLD AUTO: 7.18 K/UL (ref 3.9–12.7)

## 2024-11-04 PROCEDURE — 85025 COMPLETE CBC W/AUTO DIFF WBC: CPT | Performed by: INTERNAL MEDICINE

## 2024-11-04 PROCEDURE — 82728 ASSAY OF FERRITIN: CPT | Performed by: INTERNAL MEDICINE

## 2024-11-04 PROCEDURE — 36415 COLL VENOUS BLD VENIPUNCTURE: CPT | Mod: PO | Performed by: INTERNAL MEDICINE

## 2024-11-06 ENCOUNTER — OFFICE VISIT (OUTPATIENT)
Dept: HEMATOLOGY/ONCOLOGY | Facility: CLINIC | Age: 34
End: 2024-11-06
Payer: COMMERCIAL

## 2024-11-06 DIAGNOSIS — N92.0 MENORRHAGIA DUE TO INTRAUTERINE DEVICE (IUD): ICD-10-CM

## 2024-11-06 DIAGNOSIS — T83.89XA MENORRHAGIA DUE TO INTRAUTERINE DEVICE (IUD): ICD-10-CM

## 2024-11-06 DIAGNOSIS — E66.811 OBESITY (BMI 30.0-34.9): ICD-10-CM

## 2024-11-06 DIAGNOSIS — D50.0 IRON DEFICIENCY ANEMIA DUE TO CHRONIC BLOOD LOSS: Primary | ICD-10-CM

## 2024-11-06 PROCEDURE — 1159F MED LIST DOCD IN RCRD: CPT | Mod: CPTII,95,, | Performed by: INTERNAL MEDICINE

## 2024-11-06 PROCEDURE — G2211 COMPLEX E/M VISIT ADD ON: HCPCS | Mod: 95,,, | Performed by: INTERNAL MEDICINE

## 2024-11-06 PROCEDURE — 1160F RVW MEDS BY RX/DR IN RCRD: CPT | Mod: CPTII,95,, | Performed by: INTERNAL MEDICINE

## 2024-11-06 PROCEDURE — 99213 OFFICE O/P EST LOW 20 MIN: CPT | Mod: 95,,, | Performed by: INTERNAL MEDICINE

## 2024-11-06 PROCEDURE — 3044F HG A1C LEVEL LT 7.0%: CPT | Mod: CPTII,95,, | Performed by: INTERNAL MEDICINE

## 2024-11-06 NOTE — PROGRESS NOTES
FOLLOW UP TELEMEDICINE VISIT    Subjective:      Patient ID: Liliana Montes is a 34 y.o. female.  MRN: [id number]  : 1990    An audio and visual care visit was performed with the patient because of the COVID-19 pandemic recommendations for social distancing.    TELEMEDICINE  The patient location is: home  The chief complaint leading to consultation is: Anemia  Visit type: Virtual visit with synchronous audio and video      25 minutes of total time spent on the encounter, which includes face to face time and non-face to face time preparing to see the patient (eg, review of tests), obtaining and/or reviewing separately obtained history, documenting clinical information in the electronic or other health record, independently interpreting results (not separately reported) and communicating results to the patient/family/caregiver, or care coordination (not separately reported).    Each patient to whom he or she provides medical services by telemedicine is:  (1) informed of the relationship between the physician and patient and the respective role of any other health care provider with respect to management of the patient; and (2) notified that he or she may decline to receive medical services by telemedicine and may withdraw from such care at any time.    History of Present Illness:   HPI Liliana Montes is a 34 y.o. female presents for evaluation of her iron deficiency anemia.  She is reporting to this visit by herself.      She has been taking her oral iron 2 pills with vitamin-C on a regular basis.  She stopped having a vaginal bleed about 2 weeks ago status post IUD insertion.  She is feeling tired.  She is attributing this to her night shifts.    She denies any chest pain palpitation shortness of breath arthralgia.  She denies any melena hematochezia hematuria or hematemesis.   Oncology History:  Oncology History    No history exists.      Past medical, surgical, family, and social history were reviewed  today and there are no changes of note unless mentioned in HPI.   MEDS and ALLERGIES were reviewed with patient and meds reconciled.     History:  Past Medical History:   Diagnosis Date    Abnormal Pap smear of cervix       Past Surgical History:   Procedure Laterality Date    TOTAL REDUCTION MAMMOPLASTY  2007    Reduction      Family History   Problem Relation Name Age of Onset    No Known Problems Mother      Gout Father      Hypertension Paternal Grandmother      Breast cancer Maternal Cousin        Social History     Tobacco Use    Smoking status: Never     Passive exposure: Current    Smokeless tobacco: Never   Substance and Sexual Activity    Alcohol use: Yes     Comment: rarely    Drug use: Never    Sexual activity: Yes     Partners: Male     Birth control/protection: I.U.D.        ROS:  Answers submitted by the patient for this visit:  Review of Systems Questionnaire (Submitted on 11/1/2024)  appetite change : No  unexpected weight change: No  mouth sores: No  visual disturbance: No  cough: No  shortness of breath: No  chest pain: No  abdominal pain: No  diarrhea: No  frequency: No  back pain: No  rash: No  headaches: No  adenopathy: No  nervous/ anxious: No      Objective:   There were no vitals filed for this visit.  Wt Readings from Last 10 Encounters:   10/08/24 98.6 kg (217 lb 6 oz)   08/06/24 98.6 kg (217 lb 6 oz)   07/01/24 97 kg (213 lb 13.5 oz)   04/18/24 95 kg (209 lb 7 oz)   03/26/24 93.6 kg (206 lb 5.6 oz)   03/12/24 94.6 kg (208 lb 8.9 oz)   11/18/19 93 kg (205 lb)       Physical Examination:   Constitutional: she is alert, pleasant, and does not appear to be in any physical distress   HENT: Mouth/Throat:  Tongue is midline without evidence of glossitis  Eyes: No obvious jaundice or conjunctivitis.  EOM are normal.   Pulmonary/Chest: No stridor noted. No excess chest muscle movement.  Neurological: she is alert and oriented to person, place, and time. No cranial nerve deficit.  Skin:  No rash  noted. No erythema.   Psychiatric: she has a normal mood and affect. Speech and memory normal.     Diagnostic Tests:  Significant Imaging:  I have reviewed and interpreted all pertinent imaging results/findings.    Laboratory Data:  Lab Results   Component Value Date    WBC 7.18 11/04/2024    HGB 11.4 (L) 11/04/2024    HCT 36.3 (L) 11/04/2024     11/04/2024    CHOL 131 03/26/2024    TRIG 28 (L) 03/26/2024    HDL 64 03/26/2024    ALT 10 03/26/2024    AST 15 03/26/2024     03/26/2024    K 3.7 03/26/2024     03/26/2024    CREATININE 0.8 03/26/2024    BUN 9 03/26/2024    CO2 19 (L) 03/26/2024    TSH 1.963 03/26/2024    HGBA1C 5.3 03/26/2024        Labs:   Lab Results   Component Value Date    WBC 7.18 11/04/2024    RBC 4.61 11/04/2024    HGB 11.4 (L) 11/04/2024    HCT 36.3 (L) 11/04/2024    MCV 79 (L) 11/04/2024     11/04/2024    GLU 97 03/26/2024     03/26/2024    K 3.7 03/26/2024    BUN 9 03/26/2024    CREATININE 0.8 03/26/2024    AST 15 03/26/2024    ALT 10 03/26/2024    BILITOT 0.7 03/26/2024         Assessment/Plan:     ECOG SCORE    0 - Fully active-able to carry on all pre-disease performance without restriction       Iron-deficiency anemia secondary to to chronic blood loss.    I reviewed independently the patient medical record including her laboratory and radiologic findings.  The I discussed the with the patient the results of her blood workup that are confirming the presence of iron-deficiency anemia.  Most likely this is related to her heavy menstrual cycle.  Labs on May 30, 2024 showed a Hgb 10.5 g/dl improved from 8 g/dl on April 2, 2024. Ferritin is 15 improved from 3.  Blood workup done on August 1, 2024 showed a hemoglobin of 12.3 and an MCV of 78.  Her ferritin improved to 54.   -blood workup done on November 4, 2024 showed a hemoglobin of 11.4 g/dl with a ferritin of 23.  The drop in her counts and ferritin is most likely due to her prolonged bleeding status post IUD  insertion.  We will continue to monitor her response since the bleeding has stopped.    She is currently taking 2 pills pills during her period. The side effects of taking oral iron was discussed at length.  This include but not restricted to nausea gastritis constipation and black stools.  She was advised to take a stool softener to avoid the constipation caused by the oral iron.  In case she has not able to tolerated she was advised to call me back to schedule her to start IV iron.    She will be seen back again in 3 months for a follow-up visit with repeat CBC and ferritin.        Menorrhagia with regular cycles.    S/p IUD reinserted.  The menorrhagia has resolved.       Obesity BMI 36.17  The patient was advised to exercise with a heavy lifestyle and to lose weight                Med Onc Chart Routing      Follow up with physician 3 months. virtual with repeat cbc and ferritin   Follow up with [facility]    Infusion scheduling note    Injection scheduling note    Labs    Imaging    Pharmacy appointment    Other referrals                   Plan was discussed with the patient at length, and she verbalized understanding. Liliana was given an opportunity to ask questions that were answered to her satisfaction, and she was advised to call in the interval if any problems or questions arise.  Discussed COVID-19 and social distancing in great detail, avoid all non-essential visits out of the home if possible and avoid sick contacts.     Electronically signed by Mary Montes MD

## 2024-11-07 NOTE — PROGRESS NOTES
Patient ID: Liliana Montes is a 34 y.o. Black or  female    Subjective  Chief Complaint: patient presents for medical weight loss management.    Contraindications to GLP-1 receptor agonist therapy:   Denies personal or family history of MTC and personal history of MEN2     Pregnancy Status:   - Pt denies current pregnancy, breastfeeding, or plans to become pregnant.  - Pt denies current use of oral hormonal contraception.   - Pt has been informed about hormonal changes and potential to affect fertility while taking this medication.     Co-morbidities: none    History of weight loss therapy:  Pt previously used Adipex but denies use of GLP-1 medications.    Weight loss history:  Starting weight:    10/8/2024   Recent Readings    Weight (lbs) 216 lb    BMI 35.94 BMI      Objective  Lab Results   Component Value Date     03/26/2024     Lab Results   Component Value Date    K 3.7 03/26/2024     Lab Results   Component Value Date     03/26/2024     Lab Results   Component Value Date    CO2 19 (L) 03/26/2024     Lab Results   Component Value Date    BUN 9 03/26/2024     Lab Results   Component Value Date    GLU 97 03/26/2024     Lab Results   Component Value Date    CALCIUM 9.4 03/26/2024     Lab Results   Component Value Date    PROT 8.0 03/26/2024     Lab Results   Component Value Date    ALBUMIN 3.9 03/26/2024     Lab Results   Component Value Date    BILITOT 0.7 03/26/2024     Lab Results   Component Value Date    AST 15 03/26/2024     Lab Results   Component Value Date    ALT 10 03/26/2024     Lab Results   Component Value Date    ANIONGAP 11 03/26/2024     Lab Results   Component Value Date    CREATININE 0.8 03/26/2024     Lab Results   Component Value Date    EGFRNORACEVR >60.0 03/26/2024     Assessment/Plan  -Pt qualifies for GLP-1 RA therapy based on BMI greater than or equal to 30 kg/m2  -Initiate Wegovy 0.25 mg once weekly for 1 month  -Then increase to Wegovy 0.5 mg once weekly  for 1 month  -Then increase to Wegovy 1 mg once weekly  -RTC in 3 months    Patient consented to pharmacist management via collaborative practice.

## 2024-11-08 ENCOUNTER — OFFICE VISIT (OUTPATIENT)
Dept: INTERNAL MEDICINE | Facility: CLINIC | Age: 34
End: 2024-11-08
Payer: COMMERCIAL

## 2024-11-08 ENCOUNTER — PATIENT MESSAGE (OUTPATIENT)
Dept: INTERNAL MEDICINE | Facility: CLINIC | Age: 34
End: 2024-11-08

## 2024-11-08 DIAGNOSIS — E66.812 OBESITY, CLASS II, BMI 35-39.9: Primary | ICD-10-CM

## 2024-11-08 RX ORDER — SEMAGLUTIDE 0.5 MG/.5ML
0.5 INJECTION, SOLUTION SUBCUTANEOUS
Qty: 2 ML | Refills: 0 | Status: SHIPPED | OUTPATIENT
Start: 2024-11-08

## 2024-11-08 RX ORDER — SEMAGLUTIDE 0.25 MG/.5ML
0.25 INJECTION, SOLUTION SUBCUTANEOUS
Qty: 2 ML | Refills: 0 | Status: SHIPPED | OUTPATIENT
Start: 2024-11-08

## 2024-11-08 RX ORDER — SEMAGLUTIDE 1 MG/.5ML
1 INJECTION, SOLUTION SUBCUTANEOUS
Qty: 2 ML | Refills: 0 | Status: SHIPPED | OUTPATIENT
Start: 2024-11-08

## 2024-11-08 NOTE — PATIENT INSTRUCTIONS
Wegovy Patient Education  Savings Card  Follow this link to sign up for your Wegovy savings card. You will need this information when the specialty pharmacy contacts you to help pay for your prescription.  Wegovy Savings Card    Dosing Schedule      Choosing an Injection Site and Using your Pen       - Pens are stored in the refrigerator and can be removed 20-30 minutes before the injection to allow the medication to come to room temperature to avoid irritation, burning, or bruising with injection.   - Administration Video: Step-by-Step Instructions for Administering Wegovy    What to Expect      Rare, but Serious Side Effects  - Wegovy may cause pancreatitis or gallstones. If you experience severe abdominal pain that may radiate to the back and may or may not be accompanied by vomiting, you are encouraged to seek medical attention to assess your symptoms.     Reproduction, Pregnancy, and Lactation Considerations  - Wegovy is not recommended for use in patients who are currently pregnant or breastfeeding.   - If you plan to become pregnant, the use of this medication is not recommended at the time of conception. It is recommended that this medication should be discontinued at least 2 months prior to conception.     Patient's using Oral Contraceptives  - Use of medications like Wegovy may decrease the effectiveness of your oral hormonal contraceptives.   - You are encouraged to add a barrier method of contraception for 4 weeks after initiation and for 4 weeks after each dose titration of Wegovy to minimize this potential risk.     Missed or Changing Your Dosing Schedule  - If you miss a dose of Wegovy, take it as soon as possible, within 5 days of your scheduled dose. If more than 5 days have passed, skip the missed dose and take your next dose on your regularly scheduled day.  - If you would like to change the day of the week you take your Wegovy dose, make sure there are at least 2 days between doses.

## 2024-12-09 ENCOUNTER — PATIENT MESSAGE (OUTPATIENT)
Dept: ADMINISTRATIVE | Facility: OTHER | Age: 34
End: 2024-12-09
Payer: COMMERCIAL

## 2025-01-08 ENCOUNTER — PATIENT MESSAGE (OUTPATIENT)
Dept: ADMINISTRATIVE | Facility: OTHER | Age: 35
End: 2025-01-08
Payer: COMMERCIAL

## 2025-02-04 ENCOUNTER — LAB VISIT (OUTPATIENT)
Dept: LAB | Facility: HOSPITAL | Age: 35
End: 2025-02-04
Attending: INTERNAL MEDICINE
Payer: COMMERCIAL

## 2025-02-04 DIAGNOSIS — T83.89XA MENORRHAGIA DUE TO INTRAUTERINE DEVICE (IUD): ICD-10-CM

## 2025-02-04 DIAGNOSIS — E66.811 OBESITY (BMI 30.0-34.9): ICD-10-CM

## 2025-02-04 DIAGNOSIS — N92.0 MENORRHAGIA DUE TO INTRAUTERINE DEVICE (IUD): ICD-10-CM

## 2025-02-04 DIAGNOSIS — D50.0 IRON DEFICIENCY ANEMIA DUE TO CHRONIC BLOOD LOSS: ICD-10-CM

## 2025-02-04 LAB
BASOPHILS # BLD AUTO: 0.01 K/UL (ref 0–0.2)
BASOPHILS NFR BLD: 0.2 % (ref 0–1.9)
DIFFERENTIAL METHOD BLD: ABNORMAL
EOSINOPHIL # BLD AUTO: 0 K/UL (ref 0–0.5)
EOSINOPHIL NFR BLD: 0.8 % (ref 0–8)
ERYTHROCYTE [DISTWIDTH] IN BLOOD BY AUTOMATED COUNT: 16 % (ref 11.5–14.5)
FERRITIN SERPL-MCNC: 21 NG/ML (ref 20–300)
HCT VFR BLD AUTO: 37 % (ref 37–48.5)
HGB BLD-MCNC: 11.7 G/DL (ref 12–16)
IMM GRANULOCYTES # BLD AUTO: 0.01 K/UL (ref 0–0.04)
IMM GRANULOCYTES NFR BLD AUTO: 0.2 % (ref 0–0.5)
LYMPHOCYTES # BLD AUTO: 1.9 K/UL (ref 1–4.8)
LYMPHOCYTES NFR BLD: 35.5 % (ref 18–48)
MCH RBC QN AUTO: 25.3 PG (ref 27–31)
MCHC RBC AUTO-ENTMCNC: 31.6 G/DL (ref 32–36)
MCV RBC AUTO: 80 FL (ref 82–98)
MONOCYTES # BLD AUTO: 0.4 K/UL (ref 0.3–1)
MONOCYTES NFR BLD: 7.4 % (ref 4–15)
NEUTROPHILS # BLD AUTO: 3 K/UL (ref 1.8–7.7)
NEUTROPHILS NFR BLD: 55.9 % (ref 38–73)
NRBC BLD-RTO: 0 /100 WBC
PLATELET # BLD AUTO: 376 K/UL (ref 150–450)
PMV BLD AUTO: 10.3 FL (ref 9.2–12.9)
RBC # BLD AUTO: 4.62 M/UL (ref 4–5.4)
WBC # BLD AUTO: 5.27 K/UL (ref 3.9–12.7)

## 2025-02-04 PROCEDURE — 82728 ASSAY OF FERRITIN: CPT | Performed by: INTERNAL MEDICINE

## 2025-02-04 PROCEDURE — 36415 COLL VENOUS BLD VENIPUNCTURE: CPT | Mod: PO | Performed by: INTERNAL MEDICINE

## 2025-02-04 PROCEDURE — 85025 COMPLETE CBC W/AUTO DIFF WBC: CPT | Performed by: INTERNAL MEDICINE

## 2025-02-05 NOTE — PROGRESS NOTES
FOLLOW UP TELEMEDICINE VISIT    Subjective:      Patient ID: Liliana Montes is a 34 y.o. female.  MRN: [id number]  : 1990    An audio and visual care visit was performed with the patient because of the COVID-19 pandemic recommendations for social distancing.    TELEMEDICINE  The patient location is: home  The chief complaint leading to consultation is: Anemia  Visit type: Virtual visit with synchronous audio and video      21 minutes of total time spent on the encounter, which includes face to face time and non-face to face time preparing to see the patient (eg, review of tests), obtaining and/or reviewing separately obtained history, documenting clinical information in the electronic or other health record, independently interpreting results (not separately reported) and communicating results to the patient/family/caregiver, or care coordination (not separately reported).    Each patient to whom he or she provides medical services by telemedicine is:  (1) informed of the relationship between the physician and patient and the respective role of any other health care provider with respect to management of the patient; and (2) notified that he or she may decline to receive medical services by telemedicine and may withdraw from such care at any time.    History of Present Illness:   HPI Liliana Montes is a 34 y.o. female presents for evaluation of her iron deficiency anemia.  She is reporting to this visit by herself.      She has been taking her oral iron 2 pills with vitamin-C on during her menstrual cycle and one pill daily otherwise.  She is  still having a heavy period but not as long status post IUD insertion.  She is still spotting after her period.    She denies any chest pain palpitation shortness of breath arthralgia.  She denies any melena hematochezia hematuria or hematemesis.   Oncology History:  Oncology History    No history exists.      Past medical, surgical, family, and social history  were reviewed today and there are no changes of note unless mentioned in HPI.   MEDS and ALLERGIES were reviewed with patient and meds reconciled.     History:  Past Medical History:   Diagnosis Date    Abnormal Pap smear of cervix       Past Surgical History:   Procedure Laterality Date    TOTAL REDUCTION MAMMOPLASTY  2007    Reduction      Family History   Problem Relation Name Age of Onset    No Known Problems Mother      Gout Father      Hypertension Paternal Grandmother      Breast cancer Maternal Cousin        Social History     Tobacco Use    Smoking status: Never     Passive exposure: Current    Smokeless tobacco: Never   Substance and Sexual Activity    Alcohol use: Yes     Comment: rarely    Drug use: Never    Sexual activity: Yes     Partners: Male     Birth control/protection: I.U.D.        ROS:  Answers submitted by the patient for this visit:  Review of Systems Questionnaire (Submitted on 2/5/2025)  appetite change : No  unexpected weight change: No  mouth sores: No  visual disturbance: No  cough: No  shortness of breath: No  chest pain: No  abdominal pain: No  diarrhea: No  frequency: No  back pain: No  rash: No  headaches: No  adenopathy: No  nervous/ anxious: No    Objective:   There were no vitals filed for this visit.  Wt Readings from Last 10 Encounters:   10/08/24 98.6 kg (217 lb 6 oz)   08/06/24 98.6 kg (217 lb 6 oz)   07/01/24 97 kg (213 lb 13.5 oz)   04/18/24 95 kg (209 lb 7 oz)   03/26/24 93.6 kg (206 lb 5.6 oz)   03/12/24 94.6 kg (208 lb 8.9 oz)   11/18/19 93 kg (205 lb)       Physical Examination:   Constitutional: she is alert, pleasant, and does not appear to be in any physical distress   HENT: Mouth/Throat:  Tongue is midline without evidence of glossitis  Eyes: No obvious jaundice or conjunctivitis.  EOM are normal.   Pulmonary/Chest: No stridor noted. No excess chest muscle movement.  Neurological: she is alert and oriented to person, place, and time. No cranial nerve deficit.  Skin:   No rash noted. No erythema.   Psychiatric: she has a normal mood and affect. Speech and memory normal.     Diagnostic Tests:  Significant Imaging:  I have reviewed and interpreted all pertinent imaging results/findings.    Laboratory Data:  Lab Results   Component Value Date    WBC 5.27 02/04/2025    HGB 11.7 (L) 02/04/2025    HCT 37.0 02/04/2025     02/04/2025    CHOL 131 03/26/2024    TRIG 28 (L) 03/26/2024    HDL 64 03/26/2024    ALT 10 03/26/2024    AST 15 03/26/2024     03/26/2024    K 3.7 03/26/2024     03/26/2024    CREATININE 0.8 03/26/2024    BUN 9 03/26/2024    CO2 19 (L) 03/26/2024    TSH 1.963 03/26/2024    HGBA1C 5.3 03/26/2024        Labs:   Lab Results   Component Value Date    WBC 5.27 02/04/2025    RBC 4.62 02/04/2025    HGB 11.7 (L) 02/04/2025    HCT 37.0 02/04/2025    MCV 80 (L) 02/04/2025     02/04/2025    GLU 97 03/26/2024     03/26/2024    K 3.7 03/26/2024    BUN 9 03/26/2024    CREATININE 0.8 03/26/2024    AST 15 03/26/2024    ALT 10 03/26/2024    BILITOT 0.7 03/26/2024     Lab Results   Component Value Date    FERRITIN 21 02/04/2025         Assessment/Plan:     ECOG SCORE    0 - Fully active-able to carry on all pre-disease performance without restriction       Iron-deficiency anemia secondary to to chronic blood loss.    I reviewed independently the patient medical record including her laboratory and radiologic findings.  The I discussed the with the patient the results of her blood workup that are confirming the presence of iron-deficiency anemia.  Most likely this is related to her heavy menstrual cycle.  Labs on May 30, 2024 showed a Hgb 10.5 g/dl improved from 8 g/dl on April 2, 2024. Ferritin is 15 improved from 3.  Blood workup done on August 1, 2024 showed a hemoglobin of 12.3 and an MCV of 78.  Her ferritin improved to 54.   -blood workup done on 2/4/2025 showed a hemoglobin of 11.7 g/dl with a ferritin of 21.  The drop in her counts and ferritin is  most likely due to persistent heavy bleeding status post IUD insertion.        She was advised to start  taking 2 pills pills daily. The side effects of taking oral iron was discussed at length.  This include but not restricted to nausea gastritis constipation and black stools.  She was advised to take a stool softener to avoid the constipation caused by the oral iron.  In case she has not able to tolerated she was advised to call me back to schedule her to start IV iron.        She will be seen back again in 3 months for a follow-up visit with repeat CBC and ferritin.        Menorrhagia with regular cycles.    S/p IUD placement.  Still having spotting after her period.  Due to have a follow-up with her OBGYN.       Obesity BMI 36.17  The patient was advised to exercise with a heavy lifestyle and to lose weight                Med Onc Chart Routing      Follow up with physician    Follow up with [facility] 3 months. virtual with repeat CBC and ferritin   Infusion scheduling note    Injection scheduling note    Labs    Imaging    Pharmacy appointment    Other referrals                   Plan was discussed with the patient at length, and she verbalized understanding. Liliana was given an opportunity to ask questions that were answered to her satisfaction, and she was advised to call in the interval if any problems or questions arise.  Discussed COVID-19 and social distancing in great detail, avoid all non-essential visits out of the home if possible and avoid sick contacts.     Electronically signed by Mary Montes MD

## 2025-02-06 ENCOUNTER — OFFICE VISIT (OUTPATIENT)
Dept: HEMATOLOGY/ONCOLOGY | Facility: CLINIC | Age: 35
End: 2025-02-06
Payer: COMMERCIAL

## 2025-02-06 DIAGNOSIS — N92.0 MENORRHAGIA DUE TO INTRAUTERINE DEVICE (IUD): ICD-10-CM

## 2025-02-06 DIAGNOSIS — T83.89XA MENORRHAGIA DUE TO INTRAUTERINE DEVICE (IUD): ICD-10-CM

## 2025-02-06 DIAGNOSIS — D50.0 IRON DEFICIENCY ANEMIA DUE TO CHRONIC BLOOD LOSS: Primary | ICD-10-CM

## 2025-02-06 PROCEDURE — 98005 SYNCH AUDIO-VIDEO EST LOW 20: CPT | Mod: 95,,, | Performed by: INTERNAL MEDICINE

## 2025-02-06 PROCEDURE — 1160F RVW MEDS BY RX/DR IN RCRD: CPT | Mod: CPTII,95,, | Performed by: INTERNAL MEDICINE

## 2025-02-06 PROCEDURE — 1159F MED LIST DOCD IN RCRD: CPT | Mod: CPTII,95,, | Performed by: INTERNAL MEDICINE

## 2025-02-06 PROCEDURE — G2211 COMPLEX E/M VISIT ADD ON: HCPCS | Mod: 95,,, | Performed by: INTERNAL MEDICINE

## 2025-03-27 ENCOUNTER — OFFICE VISIT (OUTPATIENT)
Dept: FAMILY MEDICINE | Facility: CLINIC | Age: 35
End: 2025-03-27
Payer: COMMERCIAL

## 2025-03-27 VITALS
SYSTOLIC BLOOD PRESSURE: 100 MMHG | HEIGHT: 65 IN | BODY MASS INDEX: 35.78 KG/M2 | DIASTOLIC BLOOD PRESSURE: 66 MMHG | WEIGHT: 214.75 LBS | OXYGEN SATURATION: 100 % | HEART RATE: 74 BPM

## 2025-03-27 DIAGNOSIS — D50.8 OTHER IRON DEFICIENCY ANEMIA: ICD-10-CM

## 2025-03-27 DIAGNOSIS — N64.4 BREAST TENDERNESS: ICD-10-CM

## 2025-03-27 DIAGNOSIS — Z00.00 ROUTINE GENERAL MEDICAL EXAMINATION AT A HEALTH CARE FACILITY: Primary | ICD-10-CM

## 2025-03-27 DIAGNOSIS — Z30.09 ENCOUNTER FOR COUNSELING REGARDING CONTRACEPTION: ICD-10-CM

## 2025-03-27 DIAGNOSIS — N92.6 IRREGULAR MENSTRUAL BLEEDING: ICD-10-CM

## 2025-03-27 PROCEDURE — 99999 PR PBB SHADOW E&M-EST. PATIENT-LVL III: CPT | Mod: PBBFAC,,, | Performed by: STUDENT IN AN ORGANIZED HEALTH CARE EDUCATION/TRAINING PROGRAM

## 2025-03-27 NOTE — PROGRESS NOTES
[facility]  Office Visit Note     SUBJECTIVE:   HPI: Liliana Montes  is a 35 y.o. female     Follows with hem/onc for iron deficiency anemia due to chronic blood loss  Last follow up Feb 2025 with Dr. Mary Montes     History of Present Illness    CHIEF COMPLAINT:  Patient presents today for follow up.  Still with concerns about continuous spotting since IUD replacement in August.    GYNECOLOGIC:  She has experienced daily spotting requiring a small pad since her copper (non-hormonal) IUD replacement in August 2024. She has history of cervical scraping prior to having children. She has been getting normal pap smears.     BREAST HEALTH:  She has breast pain on top of both breasts for few months now. She denies new lumps, bumps, nipple discharge, or skin changes around the nipples. She had breast reduction surgery at age 17. Family history significant for breast cancer in first cousin diagnosed in 40s, now in remission.    MEDICATIONS:  She takes oral iron supplementation with stool softeners for iron-induced constipation.       Lab Visit on 02/04/2025   Component Date Value Ref Range Status    WBC 02/04/2025 5.27  3.90 - 12.70 K/uL Final    RBC 02/04/2025 4.62  4.00 - 5.40 M/uL Final    Hemoglobin 02/04/2025 11.7 (L)  12.0 - 16.0 g/dL Final    Hematocrit 02/04/2025 37.0  37.0 - 48.5 % Final    MCV 02/04/2025 80 (L)  82 - 98 fL Final    MCH 02/04/2025 25.3 (L)  27.0 - 31.0 pg Final    MCHC 02/04/2025 31.6 (L)  32.0 - 36.0 g/dL Final    RDW 02/04/2025 16.0 (H)  11.5 - 14.5 % Final    Platelets 02/04/2025 376  150 - 450 K/uL Final    MPV 02/04/2025 10.3  9.2 - 12.9 fL Final    Immature Granulocytes 02/04/2025 0.2  0.0 - 0.5 % Final    Gran # (ANC) 02/04/2025 3.0  1.8 - 7.7 K/uL Final    Immature Grans (Abs) 02/04/2025 0.01  0.00 - 0.04 K/uL Final    Lymph # 02/04/2025 1.9  1.0 - 4.8 K/uL Final    Mono # 02/04/2025 0.4  0.3 - 1.0 K/uL Final    Eos # 02/04/2025 0.0  0.0 - 0.5 K/uL Final    Baso #  02/04/2025 0.01  0.00 - 0.20 K/uL Final    nRBC 02/04/2025 0  0 /100 WBC Final    Gran % 02/04/2025 55.9  38.0 - 73.0 % Final    Lymph % 02/04/2025 35.5  18.0 - 48.0 % Final    Mono % 02/04/2025 7.4  4.0 - 15.0 % Final    Eosinophil % 02/04/2025 0.8  0.0 - 8.0 % Final    Basophil % 02/04/2025 0.2  0.0 - 1.9 % Final    Differential Method 02/04/2025 Automated   Final    Ferritin 02/04/2025 21  20.0 - 300.0 ng/mL Final   Lab Visit on 11/04/2024   Component Date Value Ref Range Status    WBC 11/04/2024 7.18  3.90 - 12.70 K/uL Final    RBC 11/04/2024 4.61  4.00 - 5.40 M/uL Final    Hemoglobin 11/04/2024 11.4 (L)  12.0 - 16.0 g/dL Final    Hematocrit 11/04/2024 36.3 (L)  37.0 - 48.5 % Final    MCV 11/04/2024 79 (L)  82 - 98 fL Final    MCH 11/04/2024 24.7 (L)  27.0 - 31.0 pg Final    MCHC 11/04/2024 31.4 (L)  32.0 - 36.0 g/dL Final    RDW 11/04/2024 14.8 (H)  11.5 - 14.5 % Final    Platelets 11/04/2024 364  150 - 450 K/uL Final    MPV 11/04/2024 9.9  9.2 - 12.9 fL Final    Immature Granulocytes 11/04/2024 0.3  0.0 - 0.5 % Final    Gran # (ANC) 11/04/2024 3.8  1.8 - 7.7 K/uL Final    Immature Grans (Abs) 11/04/2024 0.02  0.00 - 0.04 K/uL Final    Lymph # 11/04/2024 2.9  1.0 - 4.8 K/uL Final    Mono # 11/04/2024 0.5  0.3 - 1.0 K/uL Final    Eos # 11/04/2024 0.0  0.0 - 0.5 K/uL Final    Baso # 11/04/2024 0.02  0.00 - 0.20 K/uL Final    nRBC 11/04/2024 0  0 /100 WBC Final    Gran % 11/04/2024 52.5  38.0 - 73.0 % Final    Lymph % 11/04/2024 39.8  18.0 - 48.0 % Final    Mono % 11/04/2024 6.5  4.0 - 15.0 % Final    Eosinophil % 11/04/2024 0.6  0.0 - 8.0 % Final    Basophil % 11/04/2024 0.3  0.0 - 1.9 % Final    Differential Method 11/04/2024 Automated   Final    Ferritin 11/04/2024 23  20.0 - 300.0 ng/mL Final         Medications Ordered Prior to Encounter[1]  Past Medical History:   Diagnosis Date    Abnormal Pap smear of cervix      Past Surgical History:   Procedure Laterality Date    TOTAL REDUCTION MAMMOPLASTY  2007     "Reduction      Past Surgical History:   Procedure Laterality Date    TOTAL REDUCTION MAMMOPLASTY  2007    Reduction      Family History   Problem Relation Name Age of Onset    No Known Problems Mother      Gout Father      Hypertension Paternal Grandmother      Breast cancer Maternal Cousin         Marital Status:   Alcohol History:  reports current alcohol use.  Tobacco History:  reports that she has never smoked. She has been exposed to tobacco smoke. She has never used smokeless tobacco.  Drug History:  reports no history of drug use.    Health Maintenance Topics with due status: Not Due       Topic Last Completion Date    Cervical Cancer Screening 03/12/2024    TETANUS VACCINE 03/26/2024    Hemoglobin A1c (Diabetic Prevention Screening) 03/26/2024    RSV Vaccine (Age 60+ and Pregnant patients) Not Due     Immunization History   Administered Date(s) Administered    Influenza - Trivalent - Fluarix, Flulaval, Fluzone, Afluria - PF 09/27/2024    Tdap 03/26/2024       Review of patient's allergies indicates:  No Known Allergies  Current Medications[2]    Review of Systems   Constitutional:  Negative for chills and fever.   Respiratory:  Negative for shortness of breath.    Cardiovascular:  Negative for chest pain.   Gastrointestinal:  Negative for abdominal pain, blood in stool, nausea and vomiting.   Genitourinary:  Positive for menstrual irregularity and menstrual problem. Negative for hematuria.       OBJECTIVE:      Vitals:    03/27/25 1257   BP: 100/66   BP Location: Right arm   Patient Position: Sitting   Pulse: 74   SpO2: 100%   Weight: 97.4 kg (214 lb 11.7 oz)   Height: 5' 5" (1.651 m)     Physical Exam  Constitutional:       General: She is not in acute distress.     Appearance: She is obese. She is not ill-appearing or toxic-appearing.   HENT:      Head: Normocephalic and atraumatic.      Mouth/Throat:      Mouth: Mucous membranes are moist.      Pharynx: Uvula midline. No pharyngeal swelling. "   Cardiovascular:      Rate and Rhythm: Normal rate and regular rhythm.   Pulmonary:      Effort: Pulmonary effort is normal. No tachypnea, bradypnea, accessory muscle usage, prolonged expiration or respiratory distress.      Breath sounds: Normal breath sounds. No stridor. No wheezing, rhonchi or rales.   Abdominal:      General: Bowel sounds are normal. There is no distension.      Palpations: Abdomen is soft.      Tenderness: There is no abdominal tenderness. There is no guarding or rebound.   Neurological:      General: No focal deficit present.      Mental Status: She is alert.   Psychiatric:         Mood and Affect: Mood normal.         Behavior: Behavior normal.        Assessment:       1. Routine general medical examination at a health care facility    2. Other iron deficiency anemia    3. Low hemoglobin    4. Breast tenderness    5. Encounter for counseling regarding contraception    6. Irregular menstrual bleeding        Plan:       Routine general medical examination at a health care facility  -     Comprehensive Metabolic Panel; Future; Expected date: 03/27/2025  -     Hemoglobin A1C; Future; Expected date: 03/27/2025  -     Lipid Panel; Future; Expected date: 03/27/2025  -     TSH; Future; Expected date: 03/27/2025  -     Vitamin D; Future; Expected date: 03/27/2025    Other iron deficiency anemia  - Being followed by hem  - Continuee with iron supplementation   - Likely due to chronic blood loss from irregular menstruation     Breast tenderness  -     US Breast Bilateral Limited; Future; Expected date: 03/27/2025  - Evaluated the patient's report of ongoing bilateral breast pain.  - Ordered a breast ultrasound to evaluate tissue changes and further assess the breast tissue.  - Discussed the possibility of early mammogram screening due to family history.  - Ordered a breast ultrasound as an initial screening measure due to concerns about breast changes and family history of breast cancer.  -  Differentials: fibrocystic changes of breast, scar tissues from previous breast reduction surgeries     Encounter for counseling regarding contraception  DRUG-INDUCED CONSTIPATION:  - Educated the patient on normal side effects of iron supplementation, including darker stools and constipation.  - Advised continuation of stool softeners to manage constipation.  - Inquired about the patient's bowel movements.    Irregular menstrual bleeding  - Evaluated the patient's ongoing spotting with copper IUD since August, requiring daily pad use.  - Discussed potential treatment options, including hormonal contraceptives and surgical interventions.  - Explained the risks and benefits of tubal ligation vs. vasectomy.  - Assessed for contraindications to hormonal birth control -- patient denies any history of migraine with aura, hypertension, or blood clotting disorder/history of blood clots   - Explained the differences between combined hormonal contraceptives and progesterone-only options.  - Informed about potential irregular bleeding with hormonal IUDs and implants.  - Recommend follow-up with OB/GYN Dr. Blunt to discuss contraceptive options and management of ongoing spotting as I felt hesitancy from the patient regarding hormone containing contraceptive measures  - Confirmed the presence of Paragard (copper IUD) for contraception.    PERSONAL HISTORY OF CERVICAL PROCEDURES:  - Noted the patient's history of cervical scraping before having children.  - Confirmed normal pap smears since the cervical scraping.    FAMILY HISTORY OF BREAST CANCER:  - Noted family history of breast cancer in the patient's maternal first cousin in her 40s.       Counseled on age and gender appropriate medical preventative services, including cancer screenings, immunizations, overall nutritional health, need for a consistent exercise regimen and an overall push towards maintaining a vigorous and active lifestyle.        Eliane Allen,  M.D.  3/27/2025    This note was created using [employer] voice recognition software that occasionally misinterprets phrases or words            [1]   Current Outpatient Medications on File Prior to Visit   Medication Sig Dispense Refill    ferrous sulfate 325 (65 FE) MG EC tablet        No current facility-administered medications on file prior to visit.   [2]   Current Outpatient Medications:     ferrous sulfate 325 (65 FE) MG EC tablet, , Disp: , Rfl:

## 2025-04-02 ENCOUNTER — HOSPITAL ENCOUNTER (OUTPATIENT)
Dept: RADIOLOGY | Facility: HOSPITAL | Age: 35
Discharge: HOME | End: 2025-04-02
Attending: STUDENT IN AN ORGANIZED HEALTH CARE EDUCATION/TRAINING PROGRAM
Payer: COMMERCIAL

## 2025-04-02 DIAGNOSIS — N64.4 BREAST TENDERNESS: ICD-10-CM

## 2025-04-08 ENCOUNTER — HOSPITAL ENCOUNTER (OUTPATIENT)
Dept: RADIOLOGY | Facility: HOSPITAL | Age: 35
Discharge: HOME | End: 2025-04-08
Attending: STUDENT IN AN ORGANIZED HEALTH CARE EDUCATION/TRAINING PROGRAM
Payer: COMMERCIAL

## 2025-04-08 ENCOUNTER — RESULTS FOLLOW-UP (OUTPATIENT)
Dept: FAMILY MEDICINE | Facility: CLINIC | Age: 35
End: 2025-04-08

## 2025-04-08 DIAGNOSIS — N64.4 BREAST TENDERNESS: ICD-10-CM

## 2025-04-08 PROCEDURE — 76642 ULTRASOUND BREAST LIMITED: CPT | Mod: TC,50

## 2025-04-08 PROCEDURE — 77066 DX MAMMO INCL CAD BI: CPT | Mod: 26,,, | Performed by: RADIOLOGY

## 2025-04-08 PROCEDURE — 76642 ULTRASOUND BREAST LIMITED: CPT | Mod: 26,50,, | Performed by: RADIOLOGY

## 2025-04-08 PROCEDURE — 77062 BREAST TOMOSYNTHESIS BI: CPT | Mod: TC

## 2025-04-08 PROCEDURE — 77062 BREAST TOMOSYNTHESIS BI: CPT | Mod: 26,,, | Performed by: RADIOLOGY

## 2025-05-06 ENCOUNTER — LAB VISIT (OUTPATIENT)
Dept: LAB | Facility: HOSPITAL | Age: 35
End: 2025-05-06
Attending: INTERNAL MEDICINE
Payer: COMMERCIAL

## 2025-05-06 DIAGNOSIS — T83.89XA MENORRHAGIA DUE TO INTRAUTERINE DEVICE (IUD): ICD-10-CM

## 2025-05-06 DIAGNOSIS — N92.0 MENORRHAGIA DUE TO INTRAUTERINE DEVICE (IUD): ICD-10-CM

## 2025-05-06 DIAGNOSIS — Z00.00 ROUTINE GENERAL MEDICAL EXAMINATION AT A HEALTH CARE FACILITY: ICD-10-CM

## 2025-05-06 DIAGNOSIS — D50.0 IRON DEFICIENCY ANEMIA DUE TO CHRONIC BLOOD LOSS: ICD-10-CM

## 2025-05-06 LAB
25(OH)D3+25(OH)D2 SERPL-MCNC: 15 NG/ML (ref 30–96)
ABSOLUTE EOSINOPHIL (OHS): 0.04 K/UL
ABSOLUTE MONOCYTE (OHS): 0.38 K/UL (ref 0.3–1)
ABSOLUTE NEUTROPHIL COUNT (OHS): 2.91 K/UL (ref 1.8–7.7)
ALBUMIN SERPL BCP-MCNC: 3.8 G/DL (ref 3.5–5.2)
ALP SERPL-CCNC: 46 UNIT/L (ref 40–150)
ALT SERPL W/O P-5'-P-CCNC: 9 UNIT/L (ref 10–44)
ANION GAP (OHS): 9 MMOL/L (ref 8–16)
AST SERPL-CCNC: 13 UNIT/L (ref 11–45)
BASOPHILS # BLD AUTO: 0.02 K/UL
BASOPHILS NFR BLD AUTO: 0.4 %
BILIRUB SERPL-MCNC: 0.5 MG/DL (ref 0.1–1)
BUN SERPL-MCNC: 10 MG/DL (ref 6–20)
CALCIUM SERPL-MCNC: 9.3 MG/DL (ref 8.7–10.5)
CHLORIDE SERPL-SCNC: 107 MMOL/L (ref 95–110)
CHOLEST SERPL-MCNC: 133 MG/DL (ref 120–199)
CHOLEST/HDLC SERPL: 2.1 {RATIO} (ref 2–5)
CO2 SERPL-SCNC: 22 MMOL/L (ref 23–29)
CREAT SERPL-MCNC: 0.8 MG/DL (ref 0.5–1.4)
EAG (OHS): 103 MG/DL (ref 68–131)
ERYTHROCYTE [DISTWIDTH] IN BLOOD BY AUTOMATED COUNT: 15.3 % (ref 11.5–14.5)
FERRITIN SERPL-MCNC: 20 NG/ML (ref 20–300)
GFR SERPLBLD CREATININE-BSD FMLA CKD-EPI: >60 ML/MIN/1.73/M2
GLUCOSE SERPL-MCNC: 89 MG/DL (ref 70–110)
HBA1C MFR BLD: 5.2 % (ref 4–5.6)
HCT VFR BLD AUTO: 36.5 % (ref 37–48.5)
HDLC SERPL-MCNC: 63 MG/DL (ref 40–75)
HDLC SERPL: 47.4 % (ref 20–50)
HGB BLD-MCNC: 11.3 GM/DL (ref 12–16)
IMM GRANULOCYTES # BLD AUTO: 0.01 K/UL (ref 0–0.04)
IMM GRANULOCYTES NFR BLD AUTO: 0.2 % (ref 0–0.5)
LDLC SERPL CALC-MCNC: 64.6 MG/DL (ref 63–159)
LYMPHOCYTES # BLD AUTO: 1.61 K/UL (ref 1–4.8)
MCH RBC QN AUTO: 24.7 PG (ref 27–31)
MCHC RBC AUTO-ENTMCNC: 31 G/DL (ref 32–36)
MCV RBC AUTO: 80 FL (ref 82–98)
NONHDLC SERPL-MCNC: 70 MG/DL
NUCLEATED RBC (/100WBC) (OHS): 0 /100 WBC
PLATELET # BLD AUTO: 412 K/UL (ref 150–450)
PMV BLD AUTO: 10.2 FL (ref 9.2–12.9)
POTASSIUM SERPL-SCNC: 4.2 MMOL/L (ref 3.5–5.1)
PROT SERPL-MCNC: 7.4 GM/DL (ref 6–8.4)
RBC # BLD AUTO: 4.58 M/UL (ref 4–5.4)
RELATIVE EOSINOPHIL (OHS): 0.8 %
RELATIVE LYMPHOCYTE (OHS): 32.4 % (ref 18–48)
RELATIVE MONOCYTE (OHS): 7.6 % (ref 4–15)
RELATIVE NEUTROPHIL (OHS): 58.6 % (ref 38–73)
SODIUM SERPL-SCNC: 138 MMOL/L (ref 136–145)
TRIGL SERPL-MCNC: 27 MG/DL (ref 30–150)
TSH SERPL-ACNC: 2.22 UIU/ML (ref 0.4–4)
WBC # BLD AUTO: 4.97 K/UL (ref 3.9–12.7)

## 2025-05-06 PROCEDURE — 84295 ASSAY OF SERUM SODIUM: CPT

## 2025-05-06 PROCEDURE — 80061 LIPID PANEL: CPT

## 2025-05-06 PROCEDURE — 83036 HEMOGLOBIN GLYCOSYLATED A1C: CPT

## 2025-05-06 PROCEDURE — 84443 ASSAY THYROID STIM HORMONE: CPT

## 2025-05-06 PROCEDURE — 36415 COLL VENOUS BLD VENIPUNCTURE: CPT | Mod: PO

## 2025-05-06 PROCEDURE — 82306 VITAMIN D 25 HYDROXY: CPT

## 2025-05-06 PROCEDURE — 85025 COMPLETE CBC W/AUTO DIFF WBC: CPT

## 2025-05-06 PROCEDURE — 82728 ASSAY OF FERRITIN: CPT

## 2025-05-07 ENCOUNTER — OFFICE VISIT (OUTPATIENT)
Dept: HEMATOLOGY/ONCOLOGY | Facility: CLINIC | Age: 35
End: 2025-05-07
Payer: COMMERCIAL

## 2025-05-07 DIAGNOSIS — E55.9 VITAMIN D DEFICIENCY: Primary | ICD-10-CM

## 2025-05-07 DIAGNOSIS — N92.0 MENORRHAGIA WITH REGULAR CYCLE: ICD-10-CM

## 2025-05-07 DIAGNOSIS — D50.9 IRON DEFICIENCY ANEMIA, UNSPECIFIED IRON DEFICIENCY ANEMIA TYPE: Primary | ICD-10-CM

## 2025-05-07 RX ORDER — ERGOCALCIFEROL 1.25 MG/1
50000 CAPSULE ORAL
Qty: 12 CAPSULE | Refills: 3 | Status: SHIPPED | OUTPATIENT
Start: 2025-05-07

## 2025-05-07 NOTE — PROGRESS NOTES
FOLLOW UP TELEMEDICINE VISIT    Subjective:      Patient ID: Liliana Montes is a 35 y.o. female.  MRN: [id number]  : 1990    An audio and visual care visit was performed with the patient because of the COVID-19 pandemic recommendations for social distancing.    TELEMEDICINE  The patient location is: home  The chief complaint leading to consultation is: Anemia  Visit type: Virtual visit with synchronous audio and video      15  minutes of total time spent on the encounter, which includes face to face time and non-face to face time preparing to see the patient (eg, review of tests), obtaining and/or reviewing separately obtained history, documenting clinical information in the electronic or other health record, independently interpreting results (not separately reported) and communicating results to the patient/family/caregiver, or care coordination (not separately reported).    Each patient to whom he or she provides medical services by telemedicine is:  (1) informed of the relationship between the physician and patient and the respective role of any other health care provider with respect to management of the patient; and (2) notified that he or she may decline to receive medical services by telemedicine and may withdraw from such care at any time.    History of Present Illness:   HPI Liliana Montes is a 35 y.o. female presents for evaluation of her iron deficiency anemia.  She is reporting to this visit by herself.    She has been compliant with taking oral iron: 2 pills with vitamin-C on during her menstrual cycle and one pill daily otherwise.  She reports that the length of her menstrual cycle has shortened - with some spotting.   Recently found to be Vitamin D deficient.  Will start Ergocalciferol 50,000 weekly today.  Overall, she feels much better.  She denies any chest pain, palpitations, shortness of breath, arthralgia, pica, HA, blurred vision, melena, hematochezia, hematuria or hematemesis.      Oncology History:  Oncology History    No history exists.      Past medical, surgical, family, and social history were reviewed today and there are no changes of note unless mentioned in HPI.   MEDS and ALLERGIES were reviewed with patient and meds reconciled.     History:  Past Medical History:   Diagnosis Date    Abnormal Pap smear of cervix       Past Surgical History:   Procedure Laterality Date    TOTAL REDUCTION MAMMOPLASTY  2007    Reduction      Family History   Problem Relation Name Age of Onset    No Known Problems Mother      Gout Father      Hypertension Paternal Grandmother      Breast cancer Maternal Cousin        Social History     Tobacco Use    Smoking status: Never     Passive exposure: Current    Smokeless tobacco: Never   Substance and Sexual Activity    Alcohol use: Yes     Comment: rarely    Drug use: Never    Sexual activity: Yes     Partners: Male     Birth control/protection: I.U.D.        ROS:      Answers submitted by the patient for this visit:  Review of Systems Questionnaire (Submitted on 5/3/2025)  appetite change : No  unexpected weight change: No  mouth sores: No  visual disturbance: No  cough: No  shortness of breath: No  chest pain: No  abdominal pain: No  diarrhea: No  frequency: No  back pain: No  rash: No  headaches: No  adenopathy: No  nervous/ anxious: No   nervous/ anxious: No    Objective:   There were no vitals filed for this visit.  Wt Readings from Last 10 Encounters:   03/27/25 97.4 kg (214 lb 11.7 oz)   10/08/24 98.6 kg (217 lb 6 oz)   08/06/24 98.6 kg (217 lb 6 oz)   07/01/24 97 kg (213 lb 13.5 oz)   04/18/24 95 kg (209 lb 7 oz)   03/26/24 93.6 kg (206 lb 5.6 oz)   03/12/24 94.6 kg (208 lb 8.9 oz)   11/18/19 93 kg (205 lb)       Physical Examination:   Constitutional: she is alert, pleasant, and does not appear to be in any physical distress   Eyes: No obvious jaundice or conjunctivitis.  EOM are normal.   Pulmonary/Chest: No stridor noted. No excess chest muscle  movement.  Neurological: she is alert and oriented to person, place, and time. No cranial nerve deficit.  Skin:  No rash noted. No erythema.   Psychiatric: she has a normal mood and affect. Speech and memory normal.       Labs reviewed  Laboratory Data:  Lab Results   Component Value Date    WBC 4.97 05/06/2025    HGB 11.3 (L) 05/06/2025    HCT 36.5 (L) 05/06/2025     05/06/2025    CHOL 133 05/06/2025    TRIG 27 (L) 05/06/2025    HDL 63 05/06/2025    ALT 9 (L) 05/06/2025    AST 13 05/06/2025     05/06/2025    K 4.2 05/06/2025     05/06/2025    CREATININE 0.8 05/06/2025    BUN 10 05/06/2025    CO2 22 (L) 05/06/2025    TSH 2.223 05/06/2025    HGBA1C 5.2 05/06/2025      Lab Results   Component Value Date    FERRITIN 20.0 05/06/2025         Assessment/Plan:    Iron-deficiency anemia secondary to to chronic blood loss.    I reviewed independently the patient medical record including her laboratory and radiologic findings.  The I discussed the with the patient the results of her blood workup that are confirming the presence of iron-deficiency anemia.  Most likely this is related to her heavy menstrual cycle.  Labs on May 30, 2024 showed a Hgb 10.5 g/dl improved from 8 g/dl on April 2, 2024. Ferritin is 15 improved from 3.  Blood workup done on August 1, 2024 showed a hemoglobin of 12.3 and an MCV of 78.  Her ferritin improved to 54.   -blood workup done on 2/4/2025 showed a hemoglobin of 11.7 g/dl with a ferritin of 21.  The drop in her counts and ferritin is most likely due to persistent heavy bleeding status post IUD insertion.    05/07/25:  Stable; re-evaluate in 2 months with labs 1-2 days prior; will call for any worsening s/s of anemia.       Menorrhagia with regular cycles.    S/p IUD placement.  Still having spotting after her period.  Due to have a follow-up with her OBGYN.  05/07/25:  IUD bleeding time has decreased    Vitamin D deficiency  05/07/25:  Monitored by PCP, to start Ergocalciferol  50,000 weekly today     Obesity BMI 36.17  The patient was advised to exercise with a heavy lifestyle and to lose weight                Med Onc Chart Routing      Follow up with physician    Follow up with [facility] 2 months. VV with labs 1-2 days prior in [place]:  CBC, iRON STUDIES/FERRITIN   Infusion scheduling note    Injection scheduling note    Labs    Imaging    Pharmacy appointment    Other referrals                   Plan was discussed with the patient at length, and she verbalized understanding. Liliana was given an opportunity to ask questions that were answered to her satisfaction, and she was advised to call in the interval if any problems or questions arise.  Discussed COVID-19 and social distancing in great detail, avoid all non-essential visits out of the home if possible and avoid sick contacts.     GEMINI Card, FNP-C  [facility]  15 minutes were spent in coordination of patient's care, record review and counseling.

## 2025-05-21 ENCOUNTER — OCCUPATIONAL HEALTH (OUTPATIENT)
Dept: URGENT CARE | Facility: CLINIC | Age: 35
End: 2025-05-21

## 2025-05-21 DIAGNOSIS — Z02.83 ENCOUNTER FOR DRUG SCREENING: Primary | ICD-10-CM

## 2025-05-21 PROCEDURE — 80305 DRUG TEST PRSMV DIR OPT OBS: CPT | Mod: S$GLB,,, | Performed by: EMERGENCY MEDICINE
